# Patient Record
Sex: FEMALE | Race: WHITE | Employment: FULL TIME | URBAN - METROPOLITAN AREA
[De-identification: names, ages, dates, MRNs, and addresses within clinical notes are randomized per-mention and may not be internally consistent; named-entity substitution may affect disease eponyms.]

---

## 2017-01-13 ENCOUNTER — OFFICE VISIT (OUTPATIENT)
Dept: NUTRITION/OBESITY MEDICINE | Facility: HOSPITAL | Age: 52
End: 2017-01-13
Attending: INTERNAL MEDICINE
Payer: COMMERCIAL

## 2017-01-13 VITALS
DIASTOLIC BLOOD PRESSURE: 100 MMHG | RESPIRATION RATE: 18 BRPM | HEIGHT: 62 IN | SYSTOLIC BLOOD PRESSURE: 146 MMHG | WEIGHT: 175.31 LBS | OXYGEN SATURATION: 99 % | HEART RATE: 101 BPM | BODY MASS INDEX: 32.26 KG/M2

## 2017-01-13 DIAGNOSIS — I10 ESSENTIAL HYPERTENSION WITH GOAL BLOOD PRESSURE LESS THAN 140/90: ICD-10-CM

## 2017-01-13 DIAGNOSIS — I10 ESSENTIAL HYPERTENSION: Primary | ICD-10-CM

## 2017-01-13 DIAGNOSIS — R63.2 BINGE EATING: ICD-10-CM

## 2017-01-13 DIAGNOSIS — Z51.81 ENCOUNTER FOR THERAPEUTIC DRUG MONITORING: ICD-10-CM

## 2017-01-13 DIAGNOSIS — E66.01 MORBID OBESITY WITH BMI OF 40.0-44.9, ADULT (HCC): ICD-10-CM

## 2017-01-13 DIAGNOSIS — E66.9 OBESITY (BMI 30-39.9): ICD-10-CM

## 2017-01-13 DIAGNOSIS — K76.0 FATTY LIVER: ICD-10-CM

## 2017-01-13 PROCEDURE — 99214 OFFICE O/P EST MOD 30 MIN: CPT | Performed by: INTERNAL MEDICINE

## 2017-01-13 NOTE — PROGRESS NOTES
3709 [facility] AND WEIGHT LOSS CLINIC  [address] [place] [address]  Dept: [phone]  Loc: [phone]     Date:   2016    Patient:  Chaz Stone  :      3/30/1965  MRN:      [id number]    Chief Comp Topics   Smoking status: Never Smoker     Smokeless tobacco: Never Used    Alcohol Use: Yes  0.0 oz/week    0 Standard drinks or equivalent per week         Comment: socially    Drug Use: No    Sexual Activity: Yes    Partners: Male    Birth Control/ Prote meal    Exercise Goals Reviewed and Discussed    Walk for  45 Minutes and Treadmill for  45 Minutes    ROS:    Constitutional: negative  Respiratory: negative  Cardiovascular: negative  Gastrointestinal: positive for constipation  Musculoskeletal:negative relatively good control. Binge eating: tolerating vyvanse    Fatty liver: should improve with weight loss    Goals for next month:  1. Keep a food log. 2. Drink 48-64 ounces of non-caloric beverages per day. No fruit juices or regular soda.   3. Increas

## 2017-01-20 ENCOUNTER — TELEPHONE (OUTPATIENT)
Dept: NUTRITION/OBESITY MEDICINE | Facility: HOSPITAL | Age: 52
End: 2017-01-20

## 2017-01-20 ENCOUNTER — LAB ENCOUNTER (OUTPATIENT)
Dept: LAB | Facility: HOSPITAL | Age: 52
End: 2017-01-20
Attending: INTERNAL MEDICINE
Payer: COMMERCIAL

## 2017-01-20 DIAGNOSIS — R63.2 BINGE EATING: ICD-10-CM

## 2017-01-20 DIAGNOSIS — I10 ESSENTIAL HYPERTENSION: ICD-10-CM

## 2017-01-20 DIAGNOSIS — K76.0 FATTY LIVER: ICD-10-CM

## 2017-01-20 DIAGNOSIS — E66.9 OBESITY (BMI 30-39.9): ICD-10-CM

## 2017-01-20 DIAGNOSIS — Z51.81 ENCOUNTER FOR THERAPEUTIC DRUG MONITORING: ICD-10-CM

## 2017-01-20 LAB
25(OH)D3 SERPL-MCNC: 31 NG/ML
ALBUMIN SERPL BCP-MCNC: 4.5 G/DL (ref 3.5–4.8)
ALBUMIN/GLOB SERPL: 1.7 {RATIO} (ref 1–2)
ALP SERPL-CCNC: 70 U/L (ref 32–100)
ALT SERPL-CCNC: 42 U/L (ref 14–54)
ANION GAP SERPL CALC-SCNC: 11 MMOL/L (ref 0–18)
AST SERPL-CCNC: 31 U/L (ref 15–41)
BASOPHILS # BLD: 0 K/UL (ref 0–0.2)
BASOPHILS NFR BLD: 1 %
BILIRUB SERPL-MCNC: 1 MG/DL (ref 0.3–1.2)
BUN SERPL-MCNC: 14 MG/DL (ref 8–20)
BUN/CREAT SERPL: 18.7 (ref 10–20)
CALCIUM SERPL-MCNC: 9.2 MG/DL (ref 8.5–10.5)
CHLORIDE SERPL-SCNC: 102 MMOL/L (ref 95–110)
CHOLEST SERPL-MCNC: 241 MG/DL (ref 110–200)
CO2 SERPL-SCNC: 25 MMOL/L (ref 22–32)
CREAT SERPL-MCNC: 0.75 MG/DL (ref 0.5–1.5)
EOSINOPHIL # BLD: 0.1 K/UL (ref 0–0.7)
EOSINOPHIL NFR BLD: 2 %
ERYTHROCYTE [DISTWIDTH] IN BLOOD BY AUTOMATED COUNT: 13.2 % (ref 11–15)
GLOBULIN PLAS-MCNC: 2.7 G/DL (ref 2.5–3.7)
GLUCOSE SERPL-MCNC: 101 MG/DL (ref 70–99)
HCT VFR BLD AUTO: 47.8 % (ref 35–48)
HDLC SERPL-MCNC: 83 MG/DL
HGB BLD-MCNC: 16.3 G/DL (ref 12–16)
LDLC SERPL CALC-MCNC: 144 MG/DL (ref 0–99)
LYMPHOCYTES # BLD: 1.3 K/UL (ref 1–4)
LYMPHOCYTES NFR BLD: 22 %
MCH RBC QN AUTO: 33 PG (ref 27–32)
MCHC RBC AUTO-ENTMCNC: 34.2 G/DL (ref 32–37)
MCV RBC AUTO: 96.5 FL (ref 80–100)
MONOCYTES # BLD: 0.3 K/UL (ref 0–1)
MONOCYTES NFR BLD: 6 %
NEUTROPHILS # BLD AUTO: 4.1 K/UL (ref 1.8–7.7)
NEUTROPHILS NFR BLD: 70 %
NONHDLC SERPL-MCNC: 158 MG/DL
OSMOLALITY UR CALC.SUM OF ELEC: 287 MOSM/KG (ref 275–295)
PLATELET # BLD AUTO: 183 K/UL (ref 140–400)
PMV BLD AUTO: 9.1 FL (ref 7.4–10.3)
POTASSIUM SERPL-SCNC: 3.1 MMOL/L (ref 3.3–5.1)
PROT SERPL-MCNC: 7.2 G/DL (ref 5.9–8.4)
RBC # BLD AUTO: 4.95 M/UL (ref 3.7–5.4)
SODIUM SERPL-SCNC: 138 MMOL/L (ref 136–144)
TRIGL SERPL-MCNC: 68 MG/DL (ref 1–149)
VIT B12 SERPL-MCNC: 484 PG/ML (ref 181–914)
WBC # BLD AUTO: 5.8 K/UL (ref 4–11)

## 2017-01-20 PROCEDURE — 36415 COLL VENOUS BLD VENIPUNCTURE: CPT

## 2017-01-20 PROCEDURE — 80053 COMPREHEN METABOLIC PANEL: CPT

## 2017-01-20 PROCEDURE — 85025 COMPLETE CBC W/AUTO DIFF WBC: CPT

## 2017-01-20 PROCEDURE — 80061 LIPID PANEL: CPT

## 2017-01-20 PROCEDURE — 82607 VITAMIN B-12: CPT

## 2017-01-20 PROCEDURE — 82306 VITAMIN D 25 HYDROXY: CPT

## 2017-03-03 ENCOUNTER — OFFICE VISIT (OUTPATIENT)
Dept: SURGERY | Facility: CLINIC | Age: 52
End: 2017-03-03
Attending: INTERNAL MEDICINE

## 2017-03-03 VITALS
SYSTOLIC BLOOD PRESSURE: 151 MMHG | WEIGHT: 166.13 LBS | DIASTOLIC BLOOD PRESSURE: 95 MMHG | RESPIRATION RATE: 16 BRPM | HEIGHT: 62 IN | BODY MASS INDEX: 30.57 KG/M2 | HEART RATE: 112 BPM

## 2017-03-03 DIAGNOSIS — E87.6 HYPOKALEMIA: ICD-10-CM

## 2017-03-03 DIAGNOSIS — R63.2 BINGE EATING: ICD-10-CM

## 2017-03-03 DIAGNOSIS — I10 ESSENTIAL HYPERTENSION: Primary | ICD-10-CM

## 2017-03-03 DIAGNOSIS — K76.0 FATTY LIVER: ICD-10-CM

## 2017-03-03 DIAGNOSIS — E66.9 OBESITY (BMI 30-39.9): ICD-10-CM

## 2017-03-03 DIAGNOSIS — Z51.81 ENCOUNTER FOR THERAPEUTIC DRUG MONITORING: ICD-10-CM

## 2017-03-03 PROCEDURE — 99214 OFFICE O/P EST MOD 30 MIN: CPT | Performed by: INTERNAL MEDICINE

## 2017-03-03 RX ORDER — POTASSIUM CHLORIDE 750 MG/1
10 TABLET, FILM COATED, EXTENDED RELEASE ORAL DAILY
Qty: 30 TABLET | Refills: 0 | Status: SHIPPED | OUTPATIENT
Start: 2017-03-03 | End: 2017-04-28 | Stop reason: ALTCHOICE

## 2017-03-03 NOTE — PROGRESS NOTES
3701 [facility] AND WEIGHT LOSS CLINIC  [address] [place] [address]  Dept: [phone]  Loc: [phone]     Date:   2016    Patient:  Zeke Veras  :      3/30/1965  MRN:      [id number]    Chief Comp Name: N/A    Years of Education: N/A  Number of Children: N/A     Occupational History  None on file     Social History Main Topics   Smoking status: Never Smoker     Smokeless tobacco: Never Used    Alcohol Use: Yes  0.0 oz/week    0 Standard drinks or eq calorie intake, Identify triggers for eating and manage cues and Eat slowly and take 20 to 30 minutes to complete each meal    Exercise Goals Reviewed and Discussed    Walk for  45 Minutes and Treadmill for  45 Minutes    ROS:    Constitutional: negative Refill vyvanse  ekg done    No orders of the defined types were placed in this encounter. Blood work done  Will start K-Dur 10 mEq daily.      Follow up with Jeramy Urbina MD

## 2017-03-09 ENCOUNTER — TELEPHONE (OUTPATIENT)
Dept: FAMILY MEDICINE CLINIC | Facility: CLINIC | Age: 52
End: 2017-03-09

## 2017-03-09 NOTE — TELEPHONE ENCOUNTER
Patient reports that she has been waking up very sleepy in the morning and noticed that her BP readings have been low in the morning (upon waking) and at night (before bed). BP has been 107/80 in the mornings and at nights.  Thinks that medication may need

## 2017-03-13 NOTE — TELEPHONE ENCOUNTER
Lm ext [phone] this was our final attempt can call us back at [phone] for question or concerns. Thanks

## 2017-04-08 ENCOUNTER — OFFICE VISIT (OUTPATIENT)
Dept: FAMILY MEDICINE CLINIC | Facility: CLINIC | Age: 52
End: 2017-04-08

## 2017-04-08 VITALS
BODY MASS INDEX: 29.08 KG/M2 | HEIGHT: 62 IN | HEART RATE: 89 BPM | WEIGHT: 158 LBS | TEMPERATURE: 98 F | DIASTOLIC BLOOD PRESSURE: 82 MMHG | SYSTOLIC BLOOD PRESSURE: 134 MMHG | RESPIRATION RATE: 20 BRPM

## 2017-04-08 DIAGNOSIS — I10 ESSENTIAL HYPERTENSION: ICD-10-CM

## 2017-04-08 PROCEDURE — 99212 OFFICE O/P EST SF 10 MIN: CPT | Performed by: FAMILY MEDICINE

## 2017-04-08 PROCEDURE — 99213 OFFICE O/P EST LOW 20 MIN: CPT | Performed by: FAMILY MEDICINE

## 2017-04-08 NOTE — PROGRESS NOTES
HPI:    Patient ID: Ana Manuel is a 45year old female. HPI Comments: Patient is here for follow up for chronic medical issues- HTN. The patient is compliant with medications and no side effects.  The patient states medications have been helpful and pressures have been generally good. To monitor blood pressure; To call if any persistent elevation of blood pressure; Continue good diet/activity; Follow up as needed and with Dr Jessica Charles for weight loss management.        No orders of the defined types were

## 2017-04-28 ENCOUNTER — OFFICE VISIT (OUTPATIENT)
Dept: SURGERY | Facility: CLINIC | Age: 52
End: 2017-04-28

## 2017-04-28 ENCOUNTER — APPOINTMENT (OUTPATIENT)
Dept: LAB | Facility: HOSPITAL | Age: 52
End: 2017-04-28
Attending: INTERNAL MEDICINE
Payer: COMMERCIAL

## 2017-04-28 VITALS
DIASTOLIC BLOOD PRESSURE: 70 MMHG | SYSTOLIC BLOOD PRESSURE: 146 MMHG | BODY MASS INDEX: 28.63 KG/M2 | HEIGHT: 62 IN | WEIGHT: 155.56 LBS

## 2017-04-28 DIAGNOSIS — R63.2 BINGE EATING: ICD-10-CM

## 2017-04-28 DIAGNOSIS — E66.3 OVERWEIGHT (BMI 25.0-29.9): ICD-10-CM

## 2017-04-28 DIAGNOSIS — K76.0 FATTY LIVER: ICD-10-CM

## 2017-04-28 DIAGNOSIS — I10 ESSENTIAL HYPERTENSION: ICD-10-CM

## 2017-04-28 DIAGNOSIS — Z51.81 ENCOUNTER FOR THERAPEUTIC DRUG MONITORING: ICD-10-CM

## 2017-04-28 DIAGNOSIS — I10 ESSENTIAL HYPERTENSION: Primary | ICD-10-CM

## 2017-04-28 PROCEDURE — 80053 COMPREHEN METABOLIC PANEL: CPT

## 2017-04-28 PROCEDURE — 36415 COLL VENOUS BLD VENIPUNCTURE: CPT

## 2017-04-28 PROCEDURE — 99214 OFFICE O/P EST MOD 30 MIN: CPT | Performed by: INTERNAL MEDICINE

## 2017-04-28 NOTE — PROGRESS NOTES
3709 [facility] AND WEIGHT LOSS CLINIC  [address] [place] [address]  Dept: [phone]  Loc: [phone]     Date:   2016    Patient:  Mojgan Dickerson  :      3/30/1965  MRN:      [id number]    Chief Comp Social History Main Topics   Smoking status: Never Smoker     Smokeless tobacco: Never Used    Alcohol Use: Yes  0.0 oz/week    0 Standard drinks or equivalent per week         Comment: socially    Drug Use: No    Sexual Activity: Yes    Partners:  Male 30 minutes to complete each meal    Exercise Goals Reviewed and Discussed    Walk for  45 Minutes and Treadmill for  45 Minutes    ROS:    Constitutional: negative  Respiratory: negative  Cardiovascular: negative  Gastrointestinal: positive for constipatio This Encounter  Comp Metabolic Panel (14)  Standing Status: Future  Standing Expiration Date: 4/28/2018  Blood work done    Follow up with Samuel Spencer MD

## 2017-05-03 ENCOUNTER — TELEPHONE (OUTPATIENT)
Dept: SURGERY | Facility: CLINIC | Age: 52
End: 2017-05-03

## 2017-05-03 NOTE — TELEPHONE ENCOUNTER
Patient will need potassium test results so she can get a refill today, she will be going out of town tomorrow. Please follow up with patient, phone number [phone].

## 2017-05-03 NOTE — TELEPHONE ENCOUNTER
Pt is leaving for vac in am and is concerned about K+ levels. She is normal at 4. Per pt she will follow up with Dr Schuyler Reynaga when she is back from vac.

## 2017-06-30 ENCOUNTER — OFFICE VISIT (OUTPATIENT)
Dept: SURGERY | Facility: CLINIC | Age: 52
End: 2017-06-30

## 2017-06-30 VITALS
SYSTOLIC BLOOD PRESSURE: 140 MMHG | DIASTOLIC BLOOD PRESSURE: 72 MMHG | HEIGHT: 62 IN | WEIGHT: 147.13 LBS | BODY MASS INDEX: 27.08 KG/M2

## 2017-06-30 DIAGNOSIS — Z51.81 ENCOUNTER FOR THERAPEUTIC DRUG MONITORING: ICD-10-CM

## 2017-06-30 DIAGNOSIS — E66.3 OVERWEIGHT (BMI 25.0-29.9): ICD-10-CM

## 2017-06-30 DIAGNOSIS — I10 ESSENTIAL HYPERTENSION: ICD-10-CM

## 2017-06-30 DIAGNOSIS — K76.0 FATTY LIVER: ICD-10-CM

## 2017-06-30 DIAGNOSIS — R63.2 BINGE EATING: Primary | ICD-10-CM

## 2017-06-30 PROCEDURE — 99214 OFFICE O/P EST MOD 30 MIN: CPT | Performed by: INTERNAL MEDICINE

## 2017-06-30 NOTE — PROGRESS NOTES
370 [facility] AND WEIGHT LOSS CLINIC  [address] [place] [address]  Dept: [phone]  Loc: [phone]     Date:   2016    Patient:  Madeleine Joyce  :      3/30/1965  MRN:      [id number]    Chief Comp walking     Social History Narrative    The patient does not use an assistive device. .      The patient does live in a home with stairs.          Surgical History:    Past Surgical History:  No date: REDUCTION OF LARGE BREAST  Family History:    Family Hist negative    Physical Exam:   General appearance: alert, appears stated age and cooperative  Head: Normocephalic, without obvious abnormality, atraumatic  Lungs: clear to auscultation bilaterally  Heart: S1, S2 normal, no murmur, click, rub or gallop, regul

## 2017-09-08 ENCOUNTER — OFFICE VISIT (OUTPATIENT)
Dept: SURGERY | Facility: CLINIC | Age: 52
End: 2017-09-08

## 2017-09-08 VITALS
HEART RATE: 88 BPM | BODY MASS INDEX: 26.06 KG/M2 | SYSTOLIC BLOOD PRESSURE: 157 MMHG | WEIGHT: 141.63 LBS | RESPIRATION RATE: 18 BRPM | TEMPERATURE: 97 F | HEIGHT: 62 IN | OXYGEN SATURATION: 98 % | DIASTOLIC BLOOD PRESSURE: 98 MMHG

## 2017-09-08 DIAGNOSIS — Z51.81 ENCOUNTER FOR THERAPEUTIC DRUG MONITORING: ICD-10-CM

## 2017-09-08 DIAGNOSIS — R63.2 BINGE EATING: ICD-10-CM

## 2017-09-08 DIAGNOSIS — I10 ESSENTIAL HYPERTENSION: Primary | ICD-10-CM

## 2017-09-08 DIAGNOSIS — E66.3 OVERWEIGHT (BMI 25.0-29.9): ICD-10-CM

## 2017-09-08 PROCEDURE — 99214 OFFICE O/P EST MOD 30 MIN: CPT | Performed by: INTERNAL MEDICINE

## 2017-09-08 RX ORDER — DIETHYLPROPION HYDROCHLORIDE 25 MG/1
1 TABLET ORAL EVERY EVENING
Qty: 42 TABLET | Refills: 0 | Status: SHIPPED | OUTPATIENT
Start: 2017-09-08 | End: 2017-10-08

## 2017-09-08 NOTE — PROGRESS NOTES
3705 [facility] AND WEIGHT LOSS CLINIC  [address] [place] [address]  Dept: [phone]  Loc: [phone]     Date:   2016    Patient:  Sahrri Erickson  :      3/30/1965  MRN:      [id number]    Chief Comp protection:      Other Topics Concern    Caffeine Concern No    Comment: 2 cups of coffee daily, soda    Exercise Yes    Comment: walking     Social History Narrative    The patient does not use an assistive device. .      The patient does live in a home wi constipation  Musculoskeletal:negative  Neurological: negative  Behavioral/Psych: negative  Endocrine: negative  All other systems were reviewed and are negative    Physical Exam:   General appearance: alert, appears stated age and cooperative  Head: Normo

## 2017-12-07 ENCOUNTER — OFFICE VISIT (OUTPATIENT)
Dept: OBGYN CLINIC | Facility: CLINIC | Age: 52
End: 2017-12-07

## 2017-12-07 VITALS
WEIGHT: 145 LBS | HEART RATE: 85 BPM | DIASTOLIC BLOOD PRESSURE: 86 MMHG | SYSTOLIC BLOOD PRESSURE: 164 MMHG | HEIGHT: 62.25 IN | BODY MASS INDEX: 26.35 KG/M2

## 2017-12-07 DIAGNOSIS — Z12.4 SCREENING FOR MALIGNANT NEOPLASM OF CERVIX: ICD-10-CM

## 2017-12-07 DIAGNOSIS — Z01.419 ENCOUNTER FOR GYNECOLOGICAL EXAMINATION: Primary | ICD-10-CM

## 2017-12-07 DIAGNOSIS — Z12.31 ENCOUNTER FOR SCREENING MAMMOGRAM FOR BREAST CANCER: ICD-10-CM

## 2017-12-07 PROCEDURE — 99396 PREV VISIT EST AGE 40-64: CPT | Performed by: OBSTETRICS & GYNECOLOGY

## 2017-12-07 NOTE — PROGRESS NOTES
Zeke Veras is a 45year old female  No LMP recorded. Patient is postmenopausal. here for annual exam.       Last seen 16. Last pap 2014 normal.  No periods since stopping mini pill 2016. Lost 80 pounds in over a year.     OBSTETRIC bruising or bleeding.     PHYSICAL EXAM:   BP (!) 164/86   Pulse 85   Ht 5' 2.25\" (1.581 m)   Wt 145 lb (65.8 kg)   BMI 26.31 kg/m²   Wt Readings from Last 2 Encounters:  12/07/17 : 145 lb (65.8 kg)  09/08/17 : 141 lb 9.6 oz (64.2 kg)    Body mass index is

## 2017-12-08 ENCOUNTER — OFFICE VISIT (OUTPATIENT)
Dept: DERMATOLOGY CLINIC | Facility: CLINIC | Age: 52
End: 2017-12-08

## 2017-12-08 ENCOUNTER — OFFICE VISIT (OUTPATIENT)
Dept: SURGERY | Facility: CLINIC | Age: 52
End: 2017-12-08

## 2017-12-08 VITALS
WEIGHT: 143.88 LBS | RESPIRATION RATE: 18 BRPM | HEART RATE: 85 BPM | TEMPERATURE: 97 F | SYSTOLIC BLOOD PRESSURE: 151 MMHG | BODY MASS INDEX: 26.48 KG/M2 | OXYGEN SATURATION: 100 % | HEIGHT: 62 IN | DIASTOLIC BLOOD PRESSURE: 98 MMHG

## 2017-12-08 DIAGNOSIS — D23.5 BENIGN NEOPLASM OF SKIN OF TRUNK, EXCEPT SCROTUM: ICD-10-CM

## 2017-12-08 DIAGNOSIS — E66.3 OVERWEIGHT (BMI 25.0-29.9): ICD-10-CM

## 2017-12-08 DIAGNOSIS — Z51.81 ENCOUNTER FOR THERAPEUTIC DRUG MONITORING: ICD-10-CM

## 2017-12-08 DIAGNOSIS — D23.60 BENIGN NEOPLASM OF SKIN OF UPPER LIMB, INCLUDING SHOULDER, UNSPECIFIED LATERALITY: ICD-10-CM

## 2017-12-08 DIAGNOSIS — K76.0 FATTY LIVER: ICD-10-CM

## 2017-12-08 DIAGNOSIS — L82.1 SEBORRHEIC KERATOSES: Primary | ICD-10-CM

## 2017-12-08 DIAGNOSIS — D23.70 BENIGN NEOPLASM OF SKIN OF LOWER LIMB, INCLUDING HIP, UNSPECIFIED LATERALITY: ICD-10-CM

## 2017-12-08 DIAGNOSIS — I10 ESSENTIAL HYPERTENSION: Primary | ICD-10-CM

## 2017-12-08 DIAGNOSIS — L30.8 PSORIASIFORM DERMATITIS: ICD-10-CM

## 2017-12-08 DIAGNOSIS — D23.30 BENIGN NEOPLASM OF SKIN OF FACE: ICD-10-CM

## 2017-12-08 DIAGNOSIS — R20.2 NOTALGIA PARESTHETICA: ICD-10-CM

## 2017-12-08 DIAGNOSIS — D23.4 BENIGN NEOPLASM OF SCALP AND SKIN OF NECK: ICD-10-CM

## 2017-12-08 DIAGNOSIS — D22.9 MULTIPLE NEVI: ICD-10-CM

## 2017-12-08 DIAGNOSIS — R63.2 BINGE EATING: ICD-10-CM

## 2017-12-08 PROCEDURE — 99212 OFFICE O/P EST SF 10 MIN: CPT | Performed by: DERMATOLOGY

## 2017-12-08 PROCEDURE — 99213 OFFICE O/P EST LOW 20 MIN: CPT | Performed by: DERMATOLOGY

## 2017-12-08 PROCEDURE — 99214 OFFICE O/P EST MOD 30 MIN: CPT | Performed by: INTERNAL MEDICINE

## 2017-12-08 RX ORDER — TRIAMCINOLONE ACETONIDE 5 MG/G
CREAM TOPICAL
Qty: 60 G | Refills: 1 | Status: SHIPPED | OUTPATIENT
Start: 2017-12-08 | End: 2020-08-05

## 2017-12-08 NOTE — PROGRESS NOTES
3705 [facility] AND WEIGHT LOSS CLINIC  [address] [place] [address]  Dept: [phone]  Loc: [phone]     Date:   2016    Patient:  Sharri Erickson  :      3/30/1965  MRN:      [id number]    Chief Comp Exercise Yes    Comment: walking     Social History Narrative    The patient does not use an assistive device. .      The patient does live in a home with stairs.          Surgical History:    Past Surgical History:  No date: REDUCTION OF LARGE BREAST  Famil reviewed and are negative    Physical Exam:   General appearance: alert, appears stated age and cooperative  Head: Normocephalic, without obvious abnormality, atraumatic  Lungs: clear to auscultation bilaterally  Heart: S1, S2 normal, no murmur, click, rub

## 2017-12-09 ENCOUNTER — HOSPITAL ENCOUNTER (OUTPATIENT)
Dept: MAMMOGRAPHY | Facility: HOSPITAL | Age: 52
Discharge: HOME | End: 2017-12-09
Attending: OBSTETRICS & GYNECOLOGY
Payer: COMMERCIAL

## 2017-12-09 DIAGNOSIS — Z12.31 ENCOUNTER FOR SCREENING MAMMOGRAM FOR BREAST CANCER: ICD-10-CM

## 2017-12-09 PROCEDURE — 77067 SCR MAMMO BI INCL CAD: CPT | Performed by: OBSTETRICS & GYNECOLOGY

## 2017-12-18 NOTE — PROGRESS NOTES
Rosa Muñoz is a 45year old female. HPI:     CC:  Patient presents with:  Full Skin Exam: LOV: 5/08/15 pt presents for full body check. No new or changing moles. No family or personal hx of cancer. Allergies:  Patient has no known allergies. does not use an assistive device. .      The patient does live in a home with stairs.          Family History   Problem Relation Age of Onset   • Heart Disease Father      CHF   • Heart Disorder Other    • Cancer Paternal Grandmother      colon  cancer keratoses  (primary encounter diagnosis)  Multiple nevi  Benign neoplasm of scalp and skin of neck  Benign neoplasm of skin of face  Benign neoplasm of skin of trunk, except scrotum  Benign neoplasm of skin of upper limb, including shoulder, unspecified la patient. Sunscreen use, sun protection, self exams reviewed. Followup as noted RTC routine checkup 6 mos - one year or p.r.n.

## 2018-02-23 ENCOUNTER — OFFICE VISIT (OUTPATIENT)
Dept: SURGERY | Facility: CLINIC | Age: 53
End: 2018-02-23

## 2018-02-23 VITALS
SYSTOLIC BLOOD PRESSURE: 140 MMHG | DIASTOLIC BLOOD PRESSURE: 78 MMHG | HEIGHT: 62 IN | BODY MASS INDEX: 26.93 KG/M2 | WEIGHT: 146.31 LBS

## 2018-02-23 DIAGNOSIS — K76.0 FATTY LIVER: ICD-10-CM

## 2018-02-23 DIAGNOSIS — R63.2 BINGE EATING: Primary | ICD-10-CM

## 2018-02-23 DIAGNOSIS — I10 ESSENTIAL HYPERTENSION: ICD-10-CM

## 2018-02-23 DIAGNOSIS — E66.3 OVERWEIGHT (BMI 25.0-29.9): ICD-10-CM

## 2018-02-23 DIAGNOSIS — Z51.81 ENCOUNTER FOR THERAPEUTIC DRUG MONITORING: ICD-10-CM

## 2018-02-23 PROCEDURE — 99214 OFFICE O/P EST MOD 30 MIN: CPT | Performed by: INTERNAL MEDICINE

## 2018-02-23 RX ORDER — LISDEXAMFETAMINE DIMESYLATE 60 MG/1
CAPSULE ORAL
Refills: 0 | COMMUNITY
Start: 2018-01-22 | End: 2018-05-21

## 2018-05-21 ENCOUNTER — OFFICE VISIT (OUTPATIENT)
Dept: SURGERY | Facility: CLINIC | Age: 53
End: 2018-05-21

## 2018-05-21 VITALS
OXYGEN SATURATION: 100 % | SYSTOLIC BLOOD PRESSURE: 137 MMHG | BODY MASS INDEX: 26.39 KG/M2 | HEIGHT: 62 IN | HEART RATE: 86 BPM | RESPIRATION RATE: 18 BRPM | DIASTOLIC BLOOD PRESSURE: 91 MMHG | WEIGHT: 143.44 LBS

## 2018-05-21 DIAGNOSIS — Z51.81 ENCOUNTER FOR THERAPEUTIC DRUG MONITORING: ICD-10-CM

## 2018-05-21 DIAGNOSIS — E66.3 OVERWEIGHT (BMI 25.0-29.9): ICD-10-CM

## 2018-05-21 DIAGNOSIS — K76.0 FATTY LIVER: ICD-10-CM

## 2018-05-21 DIAGNOSIS — R63.2 BINGE EATING: ICD-10-CM

## 2018-05-21 DIAGNOSIS — I10 ESSENTIAL HYPERTENSION: Primary | ICD-10-CM

## 2018-05-21 PROCEDURE — 99214 OFFICE O/P EST MOD 30 MIN: CPT | Performed by: INTERNAL MEDICINE

## 2018-05-21 NOTE — PROGRESS NOTES
370 [facility] AND WEIGHT LOSS CLINIC  [address] [place] [address]  Dept: [phone]  Loc: [phone]     Date:   2016    Patient:  Alex Maxwell  :      3/30/1965  MRN:      [id number]    Chief Comp daily, soda    Exercise Yes    Comment: walking    Pt has a pacemaker No    Pt has a defibrillator No    Reaction to local anesthetic No     Social History Narrative    The patient does not use an assistive device. .      The patient does live in a home wit constipation  Musculoskeletal:negative  Neurological: negative  Behavioral/Psych: negative  Endocrine: negative  All other systems were reviewed and are negative    Physical Exam:   General appearance: alert, appears stated age and cooperative  Head: Normo

## 2018-08-24 ENCOUNTER — OFFICE VISIT (OUTPATIENT)
Dept: SURGERY | Facility: CLINIC | Age: 53
End: 2018-08-24
Payer: COMMERCIAL

## 2018-08-24 VITALS
BODY MASS INDEX: 27.8 KG/M2 | HEIGHT: 62 IN | DIASTOLIC BLOOD PRESSURE: 90 MMHG | SYSTOLIC BLOOD PRESSURE: 145 MMHG | HEART RATE: 71 BPM | RESPIRATION RATE: 18 BRPM | OXYGEN SATURATION: 99 % | WEIGHT: 151.06 LBS

## 2018-08-24 DIAGNOSIS — K76.0 FATTY LIVER: ICD-10-CM

## 2018-08-24 DIAGNOSIS — I10 ESSENTIAL HYPERTENSION: Primary | ICD-10-CM

## 2018-08-24 DIAGNOSIS — R63.2 BINGE EATING: ICD-10-CM

## 2018-08-24 DIAGNOSIS — Z51.81 ENCOUNTER FOR THERAPEUTIC DRUG MONITORING: ICD-10-CM

## 2018-08-24 DIAGNOSIS — E66.3 OVERWEIGHT (BMI 25.0-29.9): ICD-10-CM

## 2018-08-24 PROCEDURE — 99214 OFFICE O/P EST MOD 30 MIN: CPT | Performed by: INTERNAL MEDICINE

## 2018-08-24 RX ORDER — TOPIRAMATE 25 MG/1
25 TABLET ORAL 2 TIMES DAILY
Qty: 60 TABLET | Refills: 1 | Status: SHIPPED | OUTPATIENT
Start: 2018-08-24 | End: 2019-04-19

## 2018-08-24 NOTE — PROGRESS NOTES
370 [facility] AND [facility] LOSS CLIN[facility]  [address] [place] [address]  Dept: [phone]  Loc: [phone]     Date:   2016    Patient:  Christiane Martins  :      3/30/1965  MRN:      [id number]    Chief Comp History Narrative    The patient does not use an assistive device. .      The patient does live in a home with stairs.          Surgical History:    Past Surgical History:  No date: REDUCTION OF LARGE BREAST  Family History:    Family History   Problem Relat General appearance: alert, appears stated age and cooperative  Head: Normocephalic, without obvious abnormality, atraumatic  Lungs: clear to auscultation bilaterally  Heart: S1, S2 normal, no murmur, click, rub or gallop, regular rate and rhythm  Abdomen

## 2018-10-19 ENCOUNTER — OFFICE VISIT (OUTPATIENT)
Dept: SURGERY | Facility: CLINIC | Age: 53
End: 2018-10-19
Payer: COMMERCIAL

## 2018-10-19 VITALS
HEIGHT: 62 IN | BODY MASS INDEX: 26.72 KG/M2 | DIASTOLIC BLOOD PRESSURE: 80 MMHG | SYSTOLIC BLOOD PRESSURE: 140 MMHG | WEIGHT: 145.19 LBS

## 2018-10-19 DIAGNOSIS — I10 ESSENTIAL HYPERTENSION: Primary | ICD-10-CM

## 2018-10-19 DIAGNOSIS — Z51.81 ENCOUNTER FOR THERAPEUTIC DRUG MONITORING: ICD-10-CM

## 2018-10-19 DIAGNOSIS — E66.3 OVERWEIGHT (BMI 25.0-29.9): ICD-10-CM

## 2018-10-19 DIAGNOSIS — R63.2 BINGE EATING: ICD-10-CM

## 2018-10-19 DIAGNOSIS — K76.0 FATTY LIVER: ICD-10-CM

## 2018-10-19 PROCEDURE — 99213 OFFICE O/P EST LOW 20 MIN: CPT | Performed by: INTERNAL MEDICINE

## 2018-10-19 NOTE — PROGRESS NOTES
3703 [facility] AND WEIGHT LOSS CLINIC  [address] [place] [address]  Dept: [phone]  Loc: [phone]     Date:   2016    Patient:  Madeleine Joyce  :      3/30/1965  MRN:      [id number]    Chief Comp file      Transportation needs - medical: Not on file      Transportation needs - non-medical: Not on file    Occupational History      Not on file    Tobacco Use      Smoking status: Never Smoker      Smokeless tobacco: Never Used    Substance and Sexual day  · Length of time it takes to consume a meal:  20  · # of snacks per day: 1 Type of snacks:  Nuts, cheese sticks  · Amount of soda consumption per day:  0  · Amount of water (in ounces) per day:  64  · Drinking between meals only:  yes  · Toughest chal encounter diagnosis)  Fatty liver  Binge eating  Encounter for therapeutic drug monitoring  Overweight (bmi 25.0-29. 9)    PLAN   No orders of the defined types were placed in this encounter.     HYPERTENSION:  The patient's blood pressure has been well cont

## 2018-12-14 ENCOUNTER — TELEPHONE (OUTPATIENT)
Dept: OBGYN CLINIC | Facility: CLINIC | Age: 53
End: 2018-12-14

## 2019-01-18 ENCOUNTER — OFFICE VISIT (OUTPATIENT)
Dept: SURGERY | Facility: CLINIC | Age: 54
End: 2019-01-18
Payer: COMMERCIAL

## 2019-01-18 VITALS
RESPIRATION RATE: 16 BRPM | DIASTOLIC BLOOD PRESSURE: 90 MMHG | WEIGHT: 151 LBS | HEART RATE: 75 BPM | OXYGEN SATURATION: 100 % | BODY MASS INDEX: 27.79 KG/M2 | HEIGHT: 62 IN | SYSTOLIC BLOOD PRESSURE: 142 MMHG

## 2019-01-18 DIAGNOSIS — R63.2 BINGE EATING: ICD-10-CM

## 2019-01-18 DIAGNOSIS — K76.0 FATTY LIVER: ICD-10-CM

## 2019-01-18 DIAGNOSIS — E66.3 OVERWEIGHT (BMI 25.0-29.9): ICD-10-CM

## 2019-01-18 DIAGNOSIS — Z51.81 ENCOUNTER FOR THERAPEUTIC DRUG MONITORING: ICD-10-CM

## 2019-01-18 DIAGNOSIS — I10 ESSENTIAL HYPERTENSION: Primary | ICD-10-CM

## 2019-01-18 PROCEDURE — 99214 OFFICE O/P EST MOD 30 MIN: CPT | Performed by: INTERNAL MEDICINE

## 2019-01-18 NOTE — PROGRESS NOTES
[address] [place] [address] AND WEIGHT LOSS CLINIC  [address] [place] [address]  Dept: [phone]  Loc: [phone]     Date:   2016    Patient:  Estefanía Portillo  :      3/30/1965  MRN:      [id number]    Chief Comp file    Occupational History      Not on file    Tobacco Use      Smoking status: Never Smoker      Smokeless tobacco: Never Used    Substance and Sexual Activity      Alcohol use: Yes        Alcohol/week: 0.0 oz        Comment: socially      Drug use:  No cheese sticks  · Amount of soda consumption per day:  0  · Amount of water (in ounces) per day:  64  · Drinking between meals only:  yes  · Toughest challenge:    Nutritional Goals  Limit carbohydrates to 100 gms per day, Eat 100-200 calories within 1 hour 25.0-29. 9)    PLAN   No orders of the defined types were placed in this encounter. HYPERTENSION:  The patient's blood pressure has been well controlled. she has been checking it as instructed and has remained in relatively good control.     Binge eating

## 2019-04-19 ENCOUNTER — OFFICE VISIT (OUTPATIENT)
Dept: SURGERY | Facility: CLINIC | Age: 54
End: 2019-04-19
Payer: COMMERCIAL

## 2019-04-19 VITALS
HEART RATE: 76 BPM | WEIGHT: 152.06 LBS | BODY MASS INDEX: 27.98 KG/M2 | OXYGEN SATURATION: 100 % | HEIGHT: 62 IN | SYSTOLIC BLOOD PRESSURE: 150 MMHG | DIASTOLIC BLOOD PRESSURE: 90 MMHG | RESPIRATION RATE: 18 BRPM

## 2019-04-19 DIAGNOSIS — I10 ESSENTIAL HYPERTENSION: Primary | ICD-10-CM

## 2019-04-19 DIAGNOSIS — Z51.81 ENCOUNTER FOR THERAPEUTIC DRUG MONITORING: ICD-10-CM

## 2019-04-19 DIAGNOSIS — R63.2 BINGE EATING: ICD-10-CM

## 2019-04-19 PROCEDURE — 99214 OFFICE O/P EST MOD 30 MIN: CPT | Performed by: INTERNAL MEDICINE

## 2019-04-19 RX ORDER — HYDROCHLOROTHIAZIDE 12.5 MG/1
12.5 CAPSULE, GELATIN COATED ORAL DAILY
Qty: 30 CAPSULE | Refills: 1 | Status: SHIPPED | OUTPATIENT
Start: 2019-04-19 | End: 2019-06-14

## 2019-04-19 NOTE — PROGRESS NOTES
[address] [place] [address] AND WEIGHT LOSS CLINIC  [address] [place] [address]  Dept: [phone]  Loc: [phone]     Date:   2016    Patient:  Ana Manuel  :      3/30/1965  MRN:      [id number]    Chief Comp Smoking status: Never Smoker      Smokeless tobacco: Never Used    Substance and Sexual Activity      Alcohol use:  Yes        Alcohol/week: 0.0 oz        Comment: socially      Drug use: No      Sexual activity: Yes        Partners: Male    Lifestyle Problem Relation Age of Onset   • Heart Disease Father         CHF   • Heart Disorder Other    • Cancer Paternal Grandmother         colon  cancer       Food Journal  · Reviewed and Discussed:       · Patient has a Food Journal?: yes   · Patient is readi rate and rhythm  Abdomen: soft, non-tender; bowel sounds normal; no masses,  no organomegaly  Extremities: extremities normal, atraumatic, no cyanosis or edema  Pulses: 2+ and symmetric  Skin: Skin color, texture, turgor normal. No rashes or lesions    ASS

## 2019-06-14 RX ORDER — HYDROCHLOROTHIAZIDE 12.5 MG/1
CAPSULE, GELATIN COATED ORAL
Qty: 30 CAPSULE | Refills: 0 | Status: SHIPPED | OUTPATIENT
Start: 2019-06-14 | End: 2019-07-17

## 2019-07-17 RX ORDER — HYDROCHLOROTHIAZIDE 12.5 MG/1
CAPSULE, GELATIN COATED ORAL
Qty: 30 CAPSULE | Refills: 0 | Status: SHIPPED | OUTPATIENT
Start: 2019-07-17 | End: 2020-07-16

## 2019-09-03 ENCOUNTER — TELEPHONE (OUTPATIENT)
Dept: GASTROENTEROLOGY | Facility: CLINIC | Age: 54
End: 2019-09-03

## 2019-09-03 NOTE — TELEPHONE ENCOUNTER
----- Message from Param Michel, RN sent at 8/13/2015 10:56 AM CDT -----  Regarding: Recall Colon  Recall colon in 5 years per GS.  Colon done 10/3/14

## 2020-02-21 ENCOUNTER — TELEPHONE (OUTPATIENT)
Dept: GASTROENTEROLOGY | Facility: CLINIC | Age: 55
End: 2020-02-21

## 2020-02-21 NOTE — TELEPHONE ENCOUNTER
Last Procedure, Date, MD:  12/16/14 Heather  Last Diagnosis:  Prominent ileocecal valve, polyps  Recalled for (mth/yrs): 5  Sedation used previously:  IV sedation  Last Prep Used (if known):  miralax/gatorade  Quality of prep (if known): excellent  An

## 2020-02-24 NOTE — TELEPHONE ENCOUNTER
Left message to call back and schedule. If patient calls back please forward call to GI Schedulers.  [phone]

## 2020-02-24 NOTE — TELEPHONE ENCOUNTER
The patient's chart has been reviewed. Okay to schedule pt for 5 year colonoscopy recall r/t screening for colon cancer with Dr. Mary Bernabe.      Advise IV Twilight or MAC sedation with split dose Colyte/TriLyte or equivalent(eRx) preparation.   -Eligible

## 2020-03-10 NOTE — TELEPHONE ENCOUNTER
Scheduled for:  Colonoscopy [address]  Provider Name:  Dr Yonis Crews  Date:  05/08/2020  Location:  [facility]  Sedation:  MAC  Time:  9:45am (pt is aware that [facility] will call the day before to confirm arrival time)    Prep:  Colyte  Meds/Allergies Reconciled?:  Phys

## 2020-04-29 ENCOUNTER — TELEPHONE (OUTPATIENT)
Dept: GASTROENTEROLOGY | Facility: CLINIC | Age: 55
End: 2020-04-29

## 2020-04-29 NOTE — TELEPHONE ENCOUNTER
Dr. Mary Bernabe--    I noticed that this patient is still on the schedule at [facility] but on your email does not state to reschedule her.  Please advise, thank you

## 2020-04-30 NOTE — TELEPHONE ENCOUNTER
This procedure is to be performed for screening and since prior to opening of elective procedures on 5/11/2020 should be rescheduled.

## 2020-05-04 NOTE — TELEPHONE ENCOUNTER
Rescheduled for:  Colonoscopy [address]803  Provider Name:  Dr. Raquel Huerta  Date:    From-5/8/20  To-7/10/20  Location:    [facility]  Sedation:  MAC  Time:    From-0945  To-0900 (pt is aware that [facility] will call the day before to confirm arrival time)   Prep:  Corinne,

## 2020-07-08 ENCOUNTER — TELEPHONE (OUTPATIENT)
Dept: GASTROENTEROLOGY | Facility: CLINIC | Age: 55
End: 2020-07-08

## 2020-07-08 NOTE — TELEPHONE ENCOUNTER
Patient called in stating she has questions about the prep instruction's for the procedure on July 10th.  Please advise

## 2020-07-08 NOTE — TELEPHONE ENCOUNTER
I spoke with this patient regarding her prep instructions which she stated that it was very confusing and asked if she could drink the prep all at once and get some sleep.  I informed her that this is the way the physicians changed the prep since they are g

## 2020-07-09 ENCOUNTER — LAB REQUISITION (OUTPATIENT)
Dept: LAB | Facility: HOSPITAL | Age: 55
End: 2020-07-09
Payer: COMMERCIAL

## 2020-07-09 DIAGNOSIS — Z20.828 CONTACT WITH AND (SUSPECTED) EXPOSURE TO OTHER VIRAL COMMUNICABLE DISEASES: ICD-10-CM

## 2020-07-09 LAB — SARS-COV-2 RNA RESP QL NAA+PROBE: NOT DETECTED

## 2020-07-10 ENCOUNTER — LAB REQUISITION (OUTPATIENT)
Dept: LAB | Facility: HOSPITAL | Age: 55
End: 2020-07-10
Payer: COMMERCIAL

## 2020-07-10 ENCOUNTER — SURGERY CENTER DOCUMENTATION (OUTPATIENT)
Dept: SURGERY | Age: 55
End: 2020-07-10

## 2020-07-10 DIAGNOSIS — Z13.9 ENCOUNTER FOR SCREENING, UNSPECIFIED: ICD-10-CM

## 2020-07-10 PROCEDURE — 88305 TISSUE EXAM BY PATHOLOGIST: CPT | Performed by: INTERNAL MEDICINE

## 2020-07-10 PROCEDURE — 45385 COLONOSCOPY W/LESION REMOVAL: CPT | Performed by: INTERNAL MEDICINE

## 2020-07-10 NOTE — PROCEDURES
[address] Endoscopy Report      Date of Procedure:  07/10/20      Preoperative Diagnosis:  1. Personal history of adenomatous colon polyp  2. Colorectal cancer screening      Postoperative Diagnosis:  1. Colon polyps  2.   Sigmo Diminutive colon polyps  2. Sigmoid colon diverticulosis, currently uncomplicated. Recommendations:  1. High-fiber diet. 2.  Follow-up biopsy results. Polyp histology to determine recommendations regarding follow-up.         Antwan Hammonds MD

## 2020-07-15 ENCOUNTER — TELEPHONE (OUTPATIENT)
Dept: GASTROENTEROLOGY | Facility: CLINIC | Age: 55
End: 2020-07-15

## 2020-07-15 NOTE — TELEPHONE ENCOUNTER
Entered into Epic:     Recall for __CLN__per __Stathopoulos___in __5 years____  Last done: 7-  Next HSI:0-   updated

## 2020-07-15 NOTE — TELEPHONE ENCOUNTER
----- Message from Antwan Hammonds MD sent at 7/14/2020  8:00 PM CDT -----  Please see the following My Chart message sent to the patient:    Tg,    I left a message on your voicemail. Your colonoscopy revealed #2 polyps which were removed.   The

## 2020-07-16 ENCOUNTER — OFFICE VISIT (OUTPATIENT)
Dept: SURGERY | Facility: CLINIC | Age: 55
End: 2020-07-16
Payer: COMMERCIAL

## 2020-07-16 VITALS
BODY MASS INDEX: 31.9 KG/M2 | DIASTOLIC BLOOD PRESSURE: 89 MMHG | SYSTOLIC BLOOD PRESSURE: 162 MMHG | HEIGHT: 62 IN | OXYGEN SATURATION: 99 % | WEIGHT: 173.38 LBS | HEART RATE: 81 BPM

## 2020-07-16 DIAGNOSIS — R63.2 BINGE EATING: ICD-10-CM

## 2020-07-16 DIAGNOSIS — E66.9 OBESITY (BMI 30-39.9): ICD-10-CM

## 2020-07-16 DIAGNOSIS — Z51.81 ENCOUNTER FOR THERAPEUTIC DRUG MONITORING: ICD-10-CM

## 2020-07-16 DIAGNOSIS — K76.0 FATTY LIVER: ICD-10-CM

## 2020-07-16 DIAGNOSIS — I10 ESSENTIAL HYPERTENSION: Primary | ICD-10-CM

## 2020-07-16 PROCEDURE — 3079F DIAST BP 80-89 MM HG: CPT | Performed by: INTERNAL MEDICINE

## 2020-07-16 PROCEDURE — 99214 OFFICE O/P EST MOD 30 MIN: CPT | Performed by: INTERNAL MEDICINE

## 2020-07-16 PROCEDURE — 3077F SYST BP >= 140 MM HG: CPT | Performed by: INTERNAL MEDICINE

## 2020-07-16 PROCEDURE — 3008F BODY MASS INDEX DOCD: CPT | Performed by: INTERNAL MEDICINE

## 2020-07-16 RX ORDER — HYDROCHLOROTHIAZIDE 12.5 MG/1
12.5 CAPSULE, GELATIN COATED ORAL DAILY
Qty: 90 CAPSULE | Refills: 1 | Status: SHIPPED | OUTPATIENT
Start: 2020-07-16 | End: 2021-01-08

## 2020-07-16 NOTE — PROGRESS NOTES
4273 [facility] AND WEIGHT LOSS CLINIC  [address] [place] [address]  Dept: [address]: [phone]         Patient:  Elizabeth Fraire  :      3/30/1965  MRN:      [id number]    Chief Complaint:  Patient p status:       Spouse name: Not on file      Number of children: Not on file      Years of education: Not on file      Highest education level: Not on file    Occupational History      Not on file    Social Needs      Financial resource strain: Not o Asked        Outdoor occupation: Not Asked        Pt has a pacemaker: No        Pt has a defibrillator: No        Breast feeding: Not Asked        Reaction to local anesthetic: No    Social History Narrative      The patient does not use an assistive devic negative  Cardiovascular: negative  Gastrointestinal: positive for constipation  Musculoskeletal:negative  Neurological: negative  Behavioral/Psych: negative  Endocrine: negative  All other systems were reviewed and are negative    Physical Exam:   General phentermine/diethylproprion combo      Walking outside    Needs updated blood work      Orders Placed This Encounter      Leptin, Serum          Standing Status: Future          Standing Expiration Date: 7/16/2021      Vitamin B12          Standing Status:

## 2020-08-05 ENCOUNTER — OFFICE VISIT (OUTPATIENT)
Dept: FAMILY MEDICINE CLINIC | Facility: CLINIC | Age: 55
End: 2020-08-05
Payer: COMMERCIAL

## 2020-08-05 VITALS
TEMPERATURE: 97 F | HEIGHT: 62 IN | HEART RATE: 90 BPM | SYSTOLIC BLOOD PRESSURE: 130 MMHG | BODY MASS INDEX: 30.55 KG/M2 | WEIGHT: 166 LBS | DIASTOLIC BLOOD PRESSURE: 86 MMHG | RESPIRATION RATE: 18 BRPM

## 2020-08-05 DIAGNOSIS — Z00.00 ROUTINE PHYSICAL EXAMINATION: ICD-10-CM

## 2020-08-05 DIAGNOSIS — Z12.31 VISIT FOR SCREENING MAMMOGRAM: ICD-10-CM

## 2020-08-05 DIAGNOSIS — J01.90 ACUTE SINUSITIS, RECURRENCE NOT SPECIFIED, UNSPECIFIED LOCATION: Primary | ICD-10-CM

## 2020-08-05 PROCEDURE — 3075F SYST BP GE 130 - 139MM HG: CPT | Performed by: FAMILY MEDICINE

## 2020-08-05 PROCEDURE — 99396 PREV VISIT EST AGE 40-64: CPT | Performed by: FAMILY MEDICINE

## 2020-08-05 PROCEDURE — 3008F BODY MASS INDEX DOCD: CPT | Performed by: FAMILY MEDICINE

## 2020-08-05 PROCEDURE — 3079F DIAST BP 80-89 MM HG: CPT | Performed by: FAMILY MEDICINE

## 2020-08-05 RX ORDER — AMOXICILLIN 875 MG/1
875 TABLET, COATED ORAL 2 TIMES DAILY
Qty: 20 TABLET | Refills: 0 | Status: SHIPPED | OUTPATIENT
Start: 2020-08-05 | End: 2021-07-08 | Stop reason: ALTCHOICE

## 2020-08-05 NOTE — PROGRESS NOTES
HPI:    Patient ID: Kaveh Pena is a 60year old female. Patient is here for routine physical exam. No acute issues. Has had some sinus congestion and has been taking decongestants. No significant chronic medical problems.  Patient is requesting panda light. Conjunctivae and EOM are normal.   Neck: Normal range of motion. Neck supple. No thyromegaly present. Cardiovascular: Normal rate, regular rhythm, normal heart sounds and intact distal pulses.    Pulmonary/Chest: Effort normal and breath sounds nor

## 2020-08-07 ENCOUNTER — APPOINTMENT (OUTPATIENT)
Dept: LAB | Facility: HOSPITAL | Age: 55
End: 2020-08-07
Attending: FAMILY MEDICINE
Payer: COMMERCIAL

## 2020-08-07 DIAGNOSIS — K76.0 FATTY LIVER: ICD-10-CM

## 2020-08-07 DIAGNOSIS — E66.9 OBESITY (BMI 30-39.9): ICD-10-CM

## 2020-08-07 DIAGNOSIS — I10 ESSENTIAL HYPERTENSION: ICD-10-CM

## 2020-08-07 DIAGNOSIS — Z00.00 ROUTINE PHYSICAL EXAMINATION: ICD-10-CM

## 2020-08-07 DIAGNOSIS — Z51.81 ENCOUNTER FOR THERAPEUTIC DRUG MONITORING: ICD-10-CM

## 2020-08-07 DIAGNOSIS — R63.2 BINGE EATING: ICD-10-CM

## 2020-08-07 LAB
ALBUMIN SERPL-MCNC: 4.3 G/DL (ref 3.4–5)
ALBUMIN/GLOB SERPL: 1.3 {RATIO} (ref 1–2)
ALP LIVER SERPL-CCNC: 54 U/L (ref 46–118)
ALT SERPL-CCNC: 32 U/L (ref 13–56)
ANION GAP SERPL CALC-SCNC: 7 MMOL/L (ref 0–18)
AST SERPL-CCNC: 17 U/L (ref 15–37)
BILIRUB SERPL-MCNC: 0.5 MG/DL (ref 0.1–2)
BUN BLD-MCNC: 13 MG/DL (ref 7–18)
BUN/CREAT SERPL: 17.1 (ref 10–20)
CALCIUM BLD-MCNC: 9.1 MG/DL (ref 8.5–10.1)
CHLORIDE SERPL-SCNC: 105 MMOL/L (ref 98–112)
CHOLEST SMN-MCNC: 232 MG/DL (ref ?–200)
CO2 SERPL-SCNC: 27 MMOL/L (ref 21–32)
CREAT BLD-MCNC: 0.76 MG/DL (ref 0.55–1.02)
DEPRECATED RDW RBC AUTO: 41.1 FL (ref 35.1–46.3)
ERYTHROCYTE [DISTWIDTH] IN BLOOD BY AUTOMATED COUNT: 11.8 % (ref 11–15)
GLOBULIN PLAS-MCNC: 3.3 G/DL (ref 2.8–4.4)
GLUCOSE BLD-MCNC: 92 MG/DL (ref 70–99)
HCT VFR BLD AUTO: 45 % (ref 35–48)
HDLC SERPL-MCNC: 89 MG/DL (ref 40–59)
HGB BLD-MCNC: 15.4 G/DL (ref 12–16)
LDLC SERPL CALC-MCNC: 124 MG/DL (ref ?–100)
M PROTEIN MFR SERPL ELPH: 7.6 G/DL (ref 6.4–8.2)
MCH RBC QN AUTO: 32.6 PG (ref 26–34)
MCHC RBC AUTO-ENTMCNC: 34.2 G/DL (ref 31–37)
MCV RBC AUTO: 95.1 FL (ref 80–100)
NONHDLC SERPL-MCNC: 143 MG/DL (ref ?–130)
OSMOLALITY SERPL CALC.SUM OF ELEC: 288 MOSM/KG (ref 275–295)
PATIENT FASTING Y/N/NP: YES
PATIENT FASTING Y/N/NP: YES
PLATELET # BLD AUTO: 185 10(3)UL (ref 150–450)
POTASSIUM SERPL-SCNC: 3.5 MMOL/L (ref 3.5–5.1)
RBC # BLD AUTO: 4.73 X10(6)UL (ref 3.8–5.3)
SODIUM SERPL-SCNC: 139 MMOL/L (ref 136–145)
TRIGL SERPL-MCNC: 96 MG/DL (ref 30–149)
TSI SER-ACNC: 2.37 MIU/ML (ref 0.36–3.74)
VIT B12 SERPL-MCNC: 665 PG/ML (ref 193–986)
VLDLC SERPL CALC-MCNC: 19 MG/DL (ref 0–30)
WBC # BLD AUTO: 4.8 X10(3) UL (ref 4–11)

## 2020-08-07 PROCEDURE — 93010 ELECTROCARDIOGRAM REPORT: CPT | Performed by: INTERNAL MEDICINE

## 2020-08-07 PROCEDURE — 93005 ELECTROCARDIOGRAM TRACING: CPT

## 2020-08-07 PROCEDURE — 82397 CHEMILUMINESCENT ASSAY: CPT

## 2020-08-07 PROCEDURE — 82607 VITAMIN B-12: CPT

## 2020-08-07 PROCEDURE — 85027 COMPLETE CBC AUTOMATED: CPT

## 2020-08-07 PROCEDURE — 80061 LIPID PANEL: CPT

## 2020-08-07 PROCEDURE — 80053 COMPREHEN METABOLIC PANEL: CPT

## 2020-08-07 PROCEDURE — 84443 ASSAY THYROID STIM HORMONE: CPT

## 2020-08-07 PROCEDURE — 36415 COLL VENOUS BLD VENIPUNCTURE: CPT

## 2020-08-10 LAB — LEPTIN: 16.5 NG/ML

## 2020-09-25 ENCOUNTER — OFFICE VISIT (OUTPATIENT)
Dept: SURGERY | Facility: CLINIC | Age: 55
End: 2020-09-25
Payer: COMMERCIAL

## 2020-09-25 VITALS
HEIGHT: 62 IN | BODY MASS INDEX: 29.08 KG/M2 | SYSTOLIC BLOOD PRESSURE: 130 MMHG | DIASTOLIC BLOOD PRESSURE: 80 MMHG | WEIGHT: 158 LBS

## 2020-09-25 DIAGNOSIS — R63.2 BINGE EATING: Primary | ICD-10-CM

## 2020-09-25 DIAGNOSIS — Z51.81 ENCOUNTER FOR THERAPEUTIC DRUG MONITORING: ICD-10-CM

## 2020-09-25 DIAGNOSIS — E66.3 OVERWEIGHT (BMI 25.0-29.9): ICD-10-CM

## 2020-09-25 DIAGNOSIS — K76.0 FATTY LIVER: ICD-10-CM

## 2020-09-25 DIAGNOSIS — I10 ESSENTIAL HYPERTENSION: ICD-10-CM

## 2020-09-25 PROCEDURE — 99214 OFFICE O/P EST MOD 30 MIN: CPT | Performed by: INTERNAL MEDICINE

## 2020-09-25 PROCEDURE — 3008F BODY MASS INDEX DOCD: CPT | Performed by: INTERNAL MEDICINE

## 2020-09-25 PROCEDURE — 3079F DIAST BP 80-89 MM HG: CPT | Performed by: INTERNAL MEDICINE

## 2020-09-25 PROCEDURE — 3075F SYST BP GE 130 - 139MM HG: CPT | Performed by: INTERNAL MEDICINE

## 2020-09-25 NOTE — PROGRESS NOTES
9569 [facility] AND WEIGHT LOSS CLINIC  [address] [place] [address]  Dept: [address]: [phone]         Patient:  Lea Guzman  :      3/30/1965  MRN:      [id number]    Chief Complaint:  Patient p 90 capsule 1     Allergies:  Patient has no known allergies.      Social History:    Social History    Socioeconomic History      Marital status:       Spouse name: Not on file      Number of children: Not on file      Years of education: Not on file Asked        Seat Belt: Not Asked        Self-Exams: Not Asked        Grew up on a farm: Not Asked        History of tanning: Not Asked        Outdoor occupation: Not Asked        Pt has a pacemaker: No        Pt has a defibrillator: No        Breast feedi Discussed    Walk for  45 Minutes and Treadmill for  45 Minutes    ROS:    Constitutional: negative  Respiratory: negative  Cardiovascular: negative  Gastrointestinal: positive for constipation  Musculoskeletal:negative  Neurological: negative  Behavioral/ encounter.       Follow up with Steve Lopez MD

## 2020-10-22 ENCOUNTER — TELEPHONE (OUTPATIENT)
Dept: SURGERY | Facility: CLINIC | Age: 55
End: 2020-10-22

## 2020-12-11 ENCOUNTER — OFFICE VISIT (OUTPATIENT)
Dept: SURGERY | Facility: CLINIC | Age: 55
End: 2020-12-11
Payer: COMMERCIAL

## 2020-12-11 VITALS
HEIGHT: 62 IN | DIASTOLIC BLOOD PRESSURE: 90 MMHG | WEIGHT: 154 LBS | BODY MASS INDEX: 28.34 KG/M2 | OXYGEN SATURATION: 100 % | HEART RATE: 83 BPM | SYSTOLIC BLOOD PRESSURE: 148 MMHG

## 2020-12-11 DIAGNOSIS — K76.0 FATTY LIVER: ICD-10-CM

## 2020-12-11 DIAGNOSIS — E66.3 OVERWEIGHT (BMI 25.0-29.9): ICD-10-CM

## 2020-12-11 DIAGNOSIS — R63.2 BINGE EATING: ICD-10-CM

## 2020-12-11 DIAGNOSIS — Z51.81 ENCOUNTER FOR THERAPEUTIC DRUG MONITORING: ICD-10-CM

## 2020-12-11 DIAGNOSIS — I10 ESSENTIAL HYPERTENSION: Primary | ICD-10-CM

## 2020-12-11 PROCEDURE — 99214 OFFICE O/P EST MOD 30 MIN: CPT | Performed by: INTERNAL MEDICINE

## 2020-12-11 PROCEDURE — 3077F SYST BP >= 140 MM HG: CPT | Performed by: INTERNAL MEDICINE

## 2020-12-11 PROCEDURE — 3080F DIAST BP >= 90 MM HG: CPT | Performed by: INTERNAL MEDICINE

## 2020-12-11 PROCEDURE — 3008F BODY MASS INDEX DOCD: CPT | Performed by: INTERNAL MEDICINE

## 2020-12-11 NOTE — PROGRESS NOTES
9464 [facility] AND WEIGHT LOSS CLINIC  [address] [place] [address]  Dept: [address]: [phone]         Patient:  Jannet Tai  :      3/30/1965  MRN:      [id number]    Chief Complaint:  Patient p on file      Number of children: Not on file      Years of education: Not on file      Highest education level: Not on file    Occupational History      Not on file    Social Needs      Financial resource strain: Not on file      Food insecurity        Wor Pt has a pacemaker: No        Pt has a defibrillator: No        Breast feeding: Not Asked        Reaction to local anesthetic: No    Social History Narrative      The patient does not use an assistive device. .        The patient does live in a home with for constipation  Musculoskeletal:negative  Neurological: negative  Behavioral/Psych: negative  Endocrine: negative  All other systems were reviewed and are negative    Physical Exam:   General appearance: alert, appears stated age and cooperative  Head: N

## 2020-12-23 ENCOUNTER — HOSPITAL ENCOUNTER (OUTPATIENT)
Dept: MAMMOGRAPHY | Facility: HOSPITAL | Age: 55
Discharge: HOME | End: 2020-12-23
Attending: FAMILY MEDICINE
Payer: COMMERCIAL

## 2020-12-23 DIAGNOSIS — Z12.31 VISIT FOR SCREENING MAMMOGRAM: ICD-10-CM

## 2020-12-23 PROCEDURE — 77063 BREAST TOMOSYNTHESIS BI: CPT | Performed by: FAMILY MEDICINE

## 2020-12-23 PROCEDURE — 77067 SCR MAMMO BI INCL CAD: CPT | Performed by: FAMILY MEDICINE

## 2021-01-08 RX ORDER — HYDROCHLOROTHIAZIDE 12.5 MG/1
CAPSULE, GELATIN COATED ORAL
Qty: 90 CAPSULE | Refills: 0 | Status: SHIPPED | OUTPATIENT
Start: 2021-01-08 | End: 2021-04-21

## 2021-01-22 RX ORDER — LISDEXAMFETAMINE DIMESYLATE 70 MG/1
CAPSULE ORAL
Qty: 30 CAPSULE | Refills: 0 | Status: SHIPPED | OUTPATIENT
Start: 2021-01-22 | End: 2021-02-25

## 2021-02-25 RX ORDER — LISDEXAMFETAMINE DIMESYLATE 70 MG/1
CAPSULE ORAL
Qty: 30 CAPSULE | Refills: 0 | Status: SHIPPED | OUTPATIENT
Start: 2021-02-25 | End: 2021-04-13

## 2021-04-13 ENCOUNTER — OFFICE VISIT (OUTPATIENT)
Dept: SURGERY | Facility: CLINIC | Age: 56
End: 2021-04-13
Payer: COMMERCIAL

## 2021-04-13 VITALS
WEIGHT: 158 LBS | DIASTOLIC BLOOD PRESSURE: 80 MMHG | HEIGHT: 62 IN | BODY MASS INDEX: 29.08 KG/M2 | SYSTOLIC BLOOD PRESSURE: 130 MMHG | OXYGEN SATURATION: 100 % | HEART RATE: 77 BPM

## 2021-04-13 DIAGNOSIS — I10 ESSENTIAL HYPERTENSION: Primary | ICD-10-CM

## 2021-04-13 DIAGNOSIS — Z51.81 ENCOUNTER FOR THERAPEUTIC DRUG MONITORING: ICD-10-CM

## 2021-04-13 DIAGNOSIS — R63.2 BINGE EATING: ICD-10-CM

## 2021-04-13 DIAGNOSIS — E66.3 OVERWEIGHT (BMI 25.0-29.9): ICD-10-CM

## 2021-04-13 PROCEDURE — 99214 OFFICE O/P EST MOD 30 MIN: CPT | Performed by: INTERNAL MEDICINE

## 2021-04-13 PROCEDURE — 3008F BODY MASS INDEX DOCD: CPT | Performed by: INTERNAL MEDICINE

## 2021-04-13 PROCEDURE — 3075F SYST BP GE 130 - 139MM HG: CPT | Performed by: INTERNAL MEDICINE

## 2021-04-13 PROCEDURE — 3079F DIAST BP 80-89 MM HG: CPT | Performed by: INTERNAL MEDICINE

## 2021-04-13 NOTE — PROGRESS NOTES
3826 [facility] AND WEIGHT LOSS CLINIC  [address] [place] [address]  Dept: [address]: [phone]         Patient:  Elbert Moran  :      3/30/1965  MRN:      [id number]    Chief Complaint:  Patient p Smoker      Smokeless tobacco: Never Used    Substance and Sexual Activity      Alcohol use:  Yes        Alcohol/week: 0.0 standard drinks        Comment: socially      Drug use: No      Sexual activity: Yes        Partners: Male    Other Topics      Concer     Emotionally Abused:       Physically Abused:       Sexually Abused:   Surgical History:    Past Surgical History:   Procedure Laterality Date   • REDUCTION OF LARGE BREAST       Family History:    Family History   Problem Relation Age of Onset   • He appearance: alert, appears stated age and cooperative  Head: Normocephalic, without obvious abnormality, atraumatic  Lungs: clear to auscultation bilaterally  Heart: S1, S2 normal, no murmur, click, rub or gallop, regular rate and rhythm  Abdomen: soft, no

## 2021-04-21 RX ORDER — HYDROCHLOROTHIAZIDE 12.5 MG/1
12.5 CAPSULE, GELATIN COATED ORAL DAILY
Qty: 90 CAPSULE | Refills: 0 | Status: SHIPPED | OUTPATIENT
Start: 2021-04-21 | End: 2021-07-29

## 2021-07-08 ENCOUNTER — OFFICE VISIT (OUTPATIENT)
Dept: SURGERY | Facility: CLINIC | Age: 56
End: 2021-07-08
Payer: COMMERCIAL

## 2021-07-08 VITALS
OXYGEN SATURATION: 97 % | WEIGHT: 155.88 LBS | DIASTOLIC BLOOD PRESSURE: 82 MMHG | HEART RATE: 85 BPM | BODY MASS INDEX: 28.69 KG/M2 | HEIGHT: 62 IN | SYSTOLIC BLOOD PRESSURE: 148 MMHG

## 2021-07-08 DIAGNOSIS — I10 ESSENTIAL HYPERTENSION: Primary | ICD-10-CM

## 2021-07-08 DIAGNOSIS — R63.2 BINGE EATING: ICD-10-CM

## 2021-07-08 DIAGNOSIS — Z51.81 ENCOUNTER FOR THERAPEUTIC DRUG MONITORING: ICD-10-CM

## 2021-07-08 DIAGNOSIS — E66.3 OVERWEIGHT (BMI 25.0-29.9): ICD-10-CM

## 2021-07-08 DIAGNOSIS — K76.0 FATTY LIVER: ICD-10-CM

## 2021-07-08 PROCEDURE — 99214 OFFICE O/P EST MOD 30 MIN: CPT | Performed by: INTERNAL MEDICINE

## 2021-07-08 PROCEDURE — 3077F SYST BP >= 140 MM HG: CPT | Performed by: INTERNAL MEDICINE

## 2021-07-08 PROCEDURE — 3079F DIAST BP 80-89 MM HG: CPT | Performed by: INTERNAL MEDICINE

## 2021-07-08 PROCEDURE — 3008F BODY MASS INDEX DOCD: CPT | Performed by: INTERNAL MEDICINE

## 2021-07-08 NOTE — PROGRESS NOTES
5644 [facility] AND WEIGHT LOSS CLINIC  [address] [place] [address]  Dept: [address]: [phone]         Patient:  Sandoval Reynaga  :      3/30/1965  MRN:      [id number]    Chief Complaint:  Patient p Yes        Alcohol/week: 0.0 standard drinks        Comment: socially      Drug use: No      Sexual activity: Yes        Partners: Male    Other Topics      Concerns:         Service: Not Asked        Blood Transfusions: Not Asked        Caffeine C Past Surgical History:   Procedure Laterality Date   • REDUCTION OF LARGE BREAST       Family History:    Family History   Problem Relation Age of Onset   • Heart Disease Father         CHF   • Heart Disorder Other    • Cancer Paternal Grandmother abnormality, atraumatic  Lungs: clear to auscultation bilaterally  Heart: S1, S2 normal, no murmur, click, rub or gallop, regular rate and rhythm  Abdomen: soft, non-tender; bowel sounds normal; no masses,  no organomegaly  Extremities: extremities normal,

## 2021-07-29 RX ORDER — HYDROCHLOROTHIAZIDE 12.5 MG/1
CAPSULE, GELATIN COATED ORAL
Qty: 90 CAPSULE | Refills: 0 | Status: SHIPPED | OUTPATIENT
Start: 2021-07-29

## 2021-12-13 ENCOUNTER — TELEMEDICINE (OUTPATIENT)
Dept: FAMILY MEDICINE CLINIC | Facility: CLINIC | Age: 56
End: 2021-12-13
Payer: COMMERCIAL

## 2021-12-13 DIAGNOSIS — J01.90 ACUTE SINUSITIS, RECURRENCE NOT SPECIFIED, UNSPECIFIED LOCATION: Primary | ICD-10-CM

## 2021-12-13 PROCEDURE — 99213 OFFICE O/P EST LOW 20 MIN: CPT | Performed by: FAMILY MEDICINE

## 2021-12-13 RX ORDER — AMOXICILLIN 875 MG/1
875 TABLET, COATED ORAL 2 TIMES DAILY
Qty: 20 TABLET | Refills: 0 | Status: SHIPPED | OUTPATIENT
Start: 2021-12-13

## 2021-12-13 NOTE — PROGRESS NOTES
Subjective:   Patient ID: Valerie Diaz is a 60year old female. Virtual Telephone Check-In    Valerie Diaz verbally consents to a Virtual/Telephone Check-In visit on 12/13/21.   Patient has been referred to the [facility] website at [email or web address] discussed; To call if worse or not better; Follow up in one week if not resolved or as needed if worse. No orders of the defined types were placed in this encounter.       Meds This Visit:  Requested Prescriptions     Signed Prescriptions Disp Refills

## 2022-12-08 ENCOUNTER — OFFICE VISIT (OUTPATIENT)
Dept: FAMILY MEDICINE CLINIC | Facility: CLINIC | Age: 57
End: 2022-12-08
Payer: COMMERCIAL

## 2022-12-08 VITALS
WEIGHT: 169 LBS | HEIGHT: 62.4 IN | HEART RATE: 73 BPM | BODY MASS INDEX: 30.71 KG/M2 | DIASTOLIC BLOOD PRESSURE: 86 MMHG | TEMPERATURE: 98 F | RESPIRATION RATE: 16 BRPM | SYSTOLIC BLOOD PRESSURE: 142 MMHG

## 2022-12-08 DIAGNOSIS — Z00.00 ROUTINE PHYSICAL EXAMINATION: Primary | ICD-10-CM

## 2022-12-08 DIAGNOSIS — Z12.31 VISIT FOR SCREENING MAMMOGRAM: ICD-10-CM

## 2022-12-08 DIAGNOSIS — R03.0 ELEVATED BLOOD PRESSURE READING: ICD-10-CM

## 2022-12-08 PROCEDURE — 3079F DIAST BP 80-89 MM HG: CPT | Performed by: FAMILY MEDICINE

## 2022-12-08 PROCEDURE — 99396 PREV VISIT EST AGE 40-64: CPT | Performed by: FAMILY MEDICINE

## 2022-12-08 PROCEDURE — 3077F SYST BP >= 140 MM HG: CPT | Performed by: FAMILY MEDICINE

## 2022-12-08 PROCEDURE — 3008F BODY MASS INDEX DOCD: CPT | Performed by: FAMILY MEDICINE

## 2022-12-08 NOTE — PROGRESS NOTES
Subjective:   Patient ID: Patrice Toledo is a 59year old female. Patient is here for routine physical exam. No acute issues. No significant chronic medical problems. Patient is requesting testing. Diet and exercise have been fair. Not too active and gained some weight. Some stiffness of the right hand and fingers in morning. No sig family hx of arthritis. Past medical history, family history, and social history were reviewed. Has follow up with gyne scheduled for well woman exam and pap smear. Pt also had been seeing Dr. Som Monet for weight. Was on hydrochlorothiazide but stopped this and did not renew. Pt would prefer work on diet and exercise/ weight loss at this time as opposed to taking medication. History/Other:   Review of Systems   Constitutional: Negative. Negative for fever. HENT: Negative. Eyes: Negative. Respiratory: Negative. Negative for cough and shortness of breath. Cardiovascular: Negative. Negative for chest pain. Gastrointestinal: Negative. Negative for abdominal pain. Genitourinary: Negative. Negative for difficulty urinating and hematuria. Musculoskeletal: Negative. Negative for joint swelling. Arthralgias: stiffness. Skin: Negative. Neurological: Negative. Negative for dizziness and headaches. Psychiatric/Behavioral: Negative. Negative for dysphoric mood. The patient is not nervous/anxious. No current outpatient medications on file. Allergies:No Known Allergies    Objective:   Physical Exam  Constitutional:       Appearance: Normal appearance. She is well-developed. HENT:      Head: Normocephalic and atraumatic. Right Ear: Tympanic membrane and external ear normal.      Left Ear: Tympanic membrane and external ear normal.      Nose: Nose normal.      Mouth/Throat:      Mouth: Mucous membranes are moist.      Pharynx: No posterior oropharyngeal erythema.    Eyes:      Conjunctiva/sclera: Conjunctivae normal.   Neck:      Thyroid: No thyroid mass, thyromegaly or thyroid tenderness. Cardiovascular:      Rate and Rhythm: Normal rate and regular rhythm. Pulses: Normal pulses. Heart sounds: Normal heart sounds. Pulmonary:      Effort: Pulmonary effort is normal.      Breath sounds: Normal breath sounds. Abdominal:      General: Abdomen is flat. There is no distension. Palpations: Abdomen is soft. Tenderness: There is no abdominal tenderness. There is no guarding or rebound. Genitourinary:     Vagina: Normal.   Musculoskeletal:         General: Normal range of motion. Cervical back: Normal range of motion and neck supple. Lymphadenopathy:      Cervical: No cervical adenopathy. Skin:     General: Skin is warm. Neurological:      General: No focal deficit present. Mental Status: She is alert and oriented to person, place, and time. Deep Tendon Reflexes: Reflexes are normal and symmetric. Psychiatric:         Mood and Affect: Mood normal.         Behavior: Behavior normal.         Thought Content: Thought content normal.         Judgment: Judgment normal.         Assessment & Plan:   Routine physical examination:  - Exam is unremarkable. Screening tests were discussed, and after discussion, will check lab work as below. Healthy diet, exercise, and weight were discussed. To call if problems and follow up and further management after testing. Routine follow up with gyne for well woman exam and pap smear. Visit for screening mammogram:  - Mammogram ordered as requested; Follow up and further management after testing    Elevated blood pressure reading: not taking hydrochlorothiazide  - Discussed medication but declined at this time; To monitor blood pressure at home; To call if any persistent elevation of blood pressure; Discussed good diet and activity and weight loss; Follow up as needed and with Dr Aurelio Muniz for weight as needed.        Orders Placed This Encounter      Assay, Thyroid Stim Hormone Lipid Panel      Comp Metabolic Panel (14)      CBC, Platelet;  No Differential      Meds This Visit:  Requested Prescriptions      No prescriptions requested or ordered in this encounter       Imaging & Referrals:  [facility] PRACHI 2D+3D SCREENING BILAT (CPT=77067/85232)

## 2022-12-15 ENCOUNTER — LAB ENCOUNTER (OUTPATIENT)
Dept: LAB | Facility: HOSPITAL | Age: 57
End: 2022-12-15
Attending: FAMILY MEDICINE
Payer: COMMERCIAL

## 2022-12-15 DIAGNOSIS — Z00.00 ROUTINE PHYSICAL EXAMINATION: ICD-10-CM

## 2022-12-15 LAB
ALBUMIN SERPL-MCNC: 4.3 G/DL (ref 3.4–5)
ALBUMIN/GLOB SERPL: 1.3 {RATIO} (ref 1–2)
ALP LIVER SERPL-CCNC: 57 U/L
ALT SERPL-CCNC: 26 U/L
ANION GAP SERPL CALC-SCNC: 3 MMOL/L (ref 0–18)
AST SERPL-CCNC: 16 U/L (ref 15–37)
BILIRUB SERPL-MCNC: 0.7 MG/DL (ref 0.1–2)
BUN BLD-MCNC: 16 MG/DL (ref 7–18)
BUN/CREAT SERPL: 24.2 (ref 10–20)
CALCIUM BLD-MCNC: 8.7 MG/DL (ref 8.5–10.1)
CHLORIDE SERPL-SCNC: 109 MMOL/L (ref 98–112)
CHOLEST SERPL-MCNC: 276 MG/DL (ref ?–200)
CO2 SERPL-SCNC: 27 MMOL/L (ref 21–32)
CREAT BLD-MCNC: 0.66 MG/DL
DEPRECATED RDW RBC AUTO: 43.8 FL (ref 35.1–46.3)
ERYTHROCYTE [DISTWIDTH] IN BLOOD BY AUTOMATED COUNT: 12 % (ref 11–15)
FASTING PATIENT LIPID ANSWER: YES
FASTING STATUS PATIENT QL REPORTED: YES
GFR SERPLBLD BASED ON 1.73 SQ M-ARVRAT: 101 ML/MIN/1.73M2 (ref 60–?)
GLOBULIN PLAS-MCNC: 3.4 G/DL (ref 2.8–4.4)
GLUCOSE BLD-MCNC: 100 MG/DL (ref 70–99)
HCT VFR BLD AUTO: 46.3 %
HDLC SERPL-MCNC: 110 MG/DL (ref 40–59)
HGB BLD-MCNC: 15.4 G/DL
LDLC SERPL CALC-MCNC: 149 MG/DL (ref ?–100)
MCH RBC QN AUTO: 32.8 PG (ref 26–34)
MCHC RBC AUTO-ENTMCNC: 33.3 G/DL (ref 31–37)
MCV RBC AUTO: 98.7 FL
NONHDLC SERPL-MCNC: 166 MG/DL (ref ?–130)
OSMOLALITY SERPL CALC.SUM OF ELEC: 289 MOSM/KG (ref 275–295)
PLATELET # BLD AUTO: 183 10(3)UL (ref 150–450)
POTASSIUM SERPL-SCNC: 3.8 MMOL/L (ref 3.5–5.1)
PROT SERPL-MCNC: 7.7 G/DL (ref 6.4–8.2)
RBC # BLD AUTO: 4.69 X10(6)UL
SODIUM SERPL-SCNC: 139 MMOL/L (ref 136–145)
TRIGL SERPL-MCNC: 103 MG/DL (ref 30–149)
TSI SER-ACNC: 2.68 MIU/ML (ref 0.36–3.74)
VLDLC SERPL CALC-MCNC: 19 MG/DL (ref 0–30)
WBC # BLD AUTO: 4.3 X10(3) UL (ref 4–11)

## 2022-12-15 PROCEDURE — 80061 LIPID PANEL: CPT

## 2022-12-15 PROCEDURE — 80053 COMPREHEN METABOLIC PANEL: CPT

## 2022-12-15 PROCEDURE — 84443 ASSAY THYROID STIM HORMONE: CPT

## 2022-12-15 PROCEDURE — 85027 COMPLETE CBC AUTOMATED: CPT

## 2022-12-15 PROCEDURE — 36415 COLL VENOUS BLD VENIPUNCTURE: CPT

## 2022-12-29 ENCOUNTER — OFFICE VISIT (OUTPATIENT)
Dept: OBGYN CLINIC | Facility: CLINIC | Age: 57
End: 2022-12-29
Payer: COMMERCIAL

## 2022-12-29 VITALS
DIASTOLIC BLOOD PRESSURE: 88 MMHG | HEART RATE: 89 BPM | WEIGHT: 170 LBS | BODY MASS INDEX: 31 KG/M2 | SYSTOLIC BLOOD PRESSURE: 156 MMHG

## 2022-12-29 DIAGNOSIS — Z12.4 SCREENING FOR MALIGNANT NEOPLASM OF CERVIX: Primary | ICD-10-CM

## 2022-12-29 DIAGNOSIS — Z01.419 ENCOUNTER FOR GYNECOLOGICAL EXAMINATION: ICD-10-CM

## 2022-12-29 PROCEDURE — 99396 PREV VISIT EST AGE 40-64: CPT | Performed by: OBSTETRICS & GYNECOLOGY

## 2022-12-29 PROCEDURE — 3079F DIAST BP 80-89 MM HG: CPT | Performed by: OBSTETRICS & GYNECOLOGY

## 2022-12-29 PROCEDURE — 3077F SYST BP >= 140 MM HG: CPT | Performed by: OBSTETRICS & GYNECOLOGY

## 2022-12-30 LAB — HPV I/H RISK 1 DNA SPEC QL NAA+PROBE: NEGATIVE

## 2023-01-17 ENCOUNTER — HOSPITAL ENCOUNTER (OUTPATIENT)
Dept: MAMMOGRAPHY | Facility: HOSPITAL | Age: 58
Discharge: HOME | End: 2023-01-17
Attending: FAMILY MEDICINE
Payer: COMMERCIAL

## 2023-01-17 DIAGNOSIS — Z12.31 VISIT FOR SCREENING MAMMOGRAM: ICD-10-CM

## 2023-01-17 PROCEDURE — 77063 BREAST TOMOSYNTHESIS BI: CPT | Performed by: FAMILY MEDICINE

## 2023-01-17 PROCEDURE — 77067 SCR MAMMO BI INCL CAD: CPT | Performed by: FAMILY MEDICINE

## 2023-03-30 ENCOUNTER — OFFICE VISIT (OUTPATIENT)
Dept: SURGERY | Facility: CLINIC | Age: 58
End: 2023-03-30
Payer: COMMERCIAL

## 2023-03-30 VITALS
HEIGHT: 62 IN | BODY MASS INDEX: 32.27 KG/M2 | SYSTOLIC BLOOD PRESSURE: 172 MMHG | OXYGEN SATURATION: 96 % | WEIGHT: 175.38 LBS | DIASTOLIC BLOOD PRESSURE: 96 MMHG | HEART RATE: 90 BPM

## 2023-03-30 DIAGNOSIS — R63.2 BINGE EATING: ICD-10-CM

## 2023-03-30 DIAGNOSIS — Z51.81 ENCOUNTER FOR THERAPEUTIC DRUG MONITORING: ICD-10-CM

## 2023-03-30 DIAGNOSIS — I10 ESSENTIAL HYPERTENSION: Primary | ICD-10-CM

## 2023-03-30 DIAGNOSIS — E66.9 OBESITY (BMI 30-39.9): ICD-10-CM

## 2023-03-30 PROCEDURE — 3080F DIAST BP >= 90 MM HG: CPT | Performed by: INTERNAL MEDICINE

## 2023-03-30 PROCEDURE — 99215 OFFICE O/P EST HI 40 MIN: CPT | Performed by: INTERNAL MEDICINE

## 2023-03-30 PROCEDURE — 3008F BODY MASS INDEX DOCD: CPT | Performed by: INTERNAL MEDICINE

## 2023-03-30 PROCEDURE — 3077F SYST BP >= 140 MM HG: CPT | Performed by: INTERNAL MEDICINE

## 2023-03-30 RX ORDER — HYDROCHLOROTHIAZIDE 12.5 MG/1
12.5 CAPSULE, GELATIN COATED ORAL DAILY
Qty: 90 CAPSULE | Refills: 0 | Status: SHIPPED | OUTPATIENT
Start: 2023-03-30

## 2023-04-27 ENCOUNTER — LAB ENCOUNTER (OUTPATIENT)
Dept: LAB | Facility: HOSPITAL | Age: 58
End: 2023-04-27
Attending: INTERNAL MEDICINE
Payer: COMMERCIAL

## 2023-04-27 DIAGNOSIS — I10 ESSENTIAL HYPERTENSION: ICD-10-CM

## 2023-04-27 DIAGNOSIS — R63.2 BINGE EATING: ICD-10-CM

## 2023-04-27 PROCEDURE — 93010 ELECTROCARDIOGRAM REPORT: CPT | Performed by: INTERNAL MEDICINE

## 2023-04-27 PROCEDURE — 93005 ELECTROCARDIOGRAM TRACING: CPT

## 2023-04-28 LAB
ATRIAL RATE: 84 BPM
P AXIS: 44 DEGREES
P-R INTERVAL: 148 MS
Q-T INTERVAL: 394 MS
QRS DURATION: 90 MS
QTC CALCULATION (BEZET): 465 MS
R AXIS: -17 DEGREES
T AXIS: 26 DEGREES
VENTRICULAR RATE: 84 BPM

## 2023-06-23 ENCOUNTER — OFFICE VISIT (OUTPATIENT)
Dept: SURGERY | Facility: CLINIC | Age: 58
End: 2023-06-23
Payer: COMMERCIAL

## 2023-06-23 VITALS
WEIGHT: 166.31 LBS | HEIGHT: 65 IN | BODY MASS INDEX: 27.71 KG/M2 | SYSTOLIC BLOOD PRESSURE: 133 MMHG | HEART RATE: 87 BPM | OXYGEN SATURATION: 97 % | DIASTOLIC BLOOD PRESSURE: 95 MMHG

## 2023-06-23 DIAGNOSIS — E66.3 OVERWEIGHT (BMI 25.0-29.9): ICD-10-CM

## 2023-06-23 DIAGNOSIS — I10 ESSENTIAL HYPERTENSION: Primary | ICD-10-CM

## 2023-06-23 DIAGNOSIS — R63.2 BINGE EATING: ICD-10-CM

## 2023-06-23 DIAGNOSIS — Z51.81 ENCOUNTER FOR THERAPEUTIC DRUG MONITORING: ICD-10-CM

## 2023-06-23 DIAGNOSIS — K76.0 FATTY LIVER: ICD-10-CM

## 2023-06-23 PROCEDURE — 3008F BODY MASS INDEX DOCD: CPT | Performed by: INTERNAL MEDICINE

## 2023-06-23 PROCEDURE — 3080F DIAST BP >= 90 MM HG: CPT | Performed by: INTERNAL MEDICINE

## 2023-06-23 PROCEDURE — 3075F SYST BP GE 130 - 139MM HG: CPT | Performed by: INTERNAL MEDICINE

## 2023-06-23 PROCEDURE — 99214 OFFICE O/P EST MOD 30 MIN: CPT | Performed by: INTERNAL MEDICINE

## 2023-06-23 RX ORDER — HYDROCHLOROTHIAZIDE 12.5 MG/1
12.5 CAPSULE, GELATIN COATED ORAL DAILY
Qty: 90 CAPSULE | Refills: 0 | Status: SHIPPED | OUTPATIENT
Start: 2023-06-23

## 2023-09-18 ENCOUNTER — TELEMEDICINE (OUTPATIENT)
Dept: FAMILY MEDICINE CLINIC | Facility: CLINIC | Age: 58
End: 2023-09-18

## 2023-09-18 DIAGNOSIS — J01.90 ACUTE NON-RECURRENT SINUSITIS, UNSPECIFIED LOCATION: Primary | ICD-10-CM

## 2023-09-18 PROCEDURE — 99213 OFFICE O/P EST LOW 20 MIN: CPT

## 2023-09-18 RX ORDER — FLUTICASONE PROPIONATE 50 MCG
2 SPRAY, SUSPENSION (ML) NASAL DAILY
Qty: 16 G | Refills: 0 | Status: SHIPPED | OUTPATIENT
Start: 2023-09-18 | End: 2024-09-12

## 2023-09-18 RX ORDER — AMOXICILLIN AND CLAVULANATE POTASSIUM 875; 125 MG/1; MG/1
1 TABLET, FILM COATED ORAL 2 TIMES DAILY
Qty: 20 TABLET | Refills: 0 | Status: SHIPPED | OUTPATIENT
Start: 2023-09-18 | End: 2023-09-28

## 2023-09-25 RX ORDER — HYDROCHLOROTHIAZIDE 12.5 MG/1
12.5 CAPSULE, GELATIN COATED ORAL DAILY
Qty: 90 CAPSULE | Refills: 0 | Status: SHIPPED | OUTPATIENT
Start: 2023-09-25

## 2023-10-06 RX ORDER — LISDEXAMFETAMINE DIMESYLATE CAPSULES 50 MG/1
50 CAPSULE ORAL DAILY
Qty: 30 CAPSULE | Refills: 0 | Status: SHIPPED | OUTPATIENT
Start: 2023-10-06

## 2023-10-27 ENCOUNTER — OFFICE VISIT (OUTPATIENT)
Dept: SURGERY | Facility: CLINIC | Age: 58
End: 2023-10-27

## 2023-10-27 VITALS
HEART RATE: 78 BPM | BODY MASS INDEX: 26.16 KG/M2 | SYSTOLIC BLOOD PRESSURE: 138 MMHG | HEIGHT: 65 IN | OXYGEN SATURATION: 96 % | WEIGHT: 157 LBS | DIASTOLIC BLOOD PRESSURE: 74 MMHG

## 2023-10-27 DIAGNOSIS — K76.0 FATTY LIVER: ICD-10-CM

## 2023-10-27 DIAGNOSIS — I10 ESSENTIAL HYPERTENSION: Primary | ICD-10-CM

## 2023-10-27 DIAGNOSIS — E66.3 OVERWEIGHT (BMI 25.0-29.9): ICD-10-CM

## 2023-10-27 DIAGNOSIS — Z51.81 ENCOUNTER FOR THERAPEUTIC DRUG MONITORING: ICD-10-CM

## 2023-10-27 DIAGNOSIS — R63.2 BINGE EATING: ICD-10-CM

## 2023-10-27 PROCEDURE — 3078F DIAST BP <80 MM HG: CPT | Performed by: INTERNAL MEDICINE

## 2023-10-27 PROCEDURE — 3075F SYST BP GE 130 - 139MM HG: CPT | Performed by: INTERNAL MEDICINE

## 2023-10-27 PROCEDURE — 3008F BODY MASS INDEX DOCD: CPT | Performed by: INTERNAL MEDICINE

## 2023-10-27 PROCEDURE — 99214 OFFICE O/P EST MOD 30 MIN: CPT | Performed by: INTERNAL MEDICINE

## 2023-10-27 RX ORDER — LISDEXAMFETAMINE DIMESYLATE CAPSULES 60 MG/1
60 CAPSULE ORAL DAILY
Qty: 30 CAPSULE | Refills: 0 | Status: SHIPPED | OUTPATIENT
Start: 2023-12-28 | End: 2024-01-27

## 2023-10-27 RX ORDER — LISDEXAMFETAMINE DIMESYLATE CAPSULES 60 MG/1
60 CAPSULE ORAL DAILY
Qty: 30 CAPSULE | Refills: 0 | Status: SHIPPED | OUTPATIENT
Start: 2023-11-27 | End: 2023-12-27

## 2023-10-27 RX ORDER — LISDEXAMFETAMINE DIMESYLATE CAPSULES 60 MG/1
60 CAPSULE ORAL DAILY
Qty: 30 CAPSULE | Refills: 0 | Status: SHIPPED | OUTPATIENT
Start: 2023-10-27 | End: 2023-11-26

## 2023-12-26 RX ORDER — HYDROCHLOROTHIAZIDE 12.5 MG/1
12.5 CAPSULE, GELATIN COATED ORAL DAILY
Qty: 90 CAPSULE | Refills: 0 | Status: SHIPPED | OUTPATIENT
Start: 2023-12-26

## 2024-01-29 DIAGNOSIS — R63.2 BINGE EATING: ICD-10-CM

## 2024-01-29 RX ORDER — LISDEXAMFETAMINE DIMESYLATE CAPSULES 60 MG/1
60 CAPSULE ORAL DAILY
Qty: 30 CAPSULE | Refills: 0 | Status: SHIPPED | OUTPATIENT
Start: 2024-01-29

## 2024-02-29 ENCOUNTER — TELEPHONE (OUTPATIENT)
Dept: SURGERY | Facility: CLINIC | Age: 59
End: 2024-02-29

## 2024-02-29 DIAGNOSIS — R63.2 BINGE EATING: ICD-10-CM

## 2024-02-29 RX ORDER — LISDEXAMFETAMINE DIMESYLATE CAPSULES 60 MG/1
60 CAPSULE ORAL DAILY
Qty: 30 CAPSULE | Refills: 0 | Status: SHIPPED | OUTPATIENT
Start: 2024-02-29

## 2024-03-22 ENCOUNTER — OFFICE VISIT (OUTPATIENT)
Dept: SURGERY | Facility: CLINIC | Age: 59
End: 2024-03-22
Payer: COMMERCIAL

## 2024-03-22 VITALS
DIASTOLIC BLOOD PRESSURE: 82 MMHG | HEIGHT: 62 IN | SYSTOLIC BLOOD PRESSURE: 136 MMHG | OXYGEN SATURATION: 99 % | HEART RATE: 81 BPM | BODY MASS INDEX: 28.32 KG/M2 | WEIGHT: 153.88 LBS

## 2024-03-22 DIAGNOSIS — K76.0 FATTY LIVER: ICD-10-CM

## 2024-03-22 DIAGNOSIS — R63.2 BINGE EATING: ICD-10-CM

## 2024-03-22 DIAGNOSIS — I10 ESSENTIAL HYPERTENSION: Primary | ICD-10-CM

## 2024-03-22 DIAGNOSIS — E66.3 OVERWEIGHT (BMI 25.0-29.9): ICD-10-CM

## 2024-03-22 DIAGNOSIS — E55.9 VITAMIN D DEFICIENCY: ICD-10-CM

## 2024-03-22 DIAGNOSIS — Z51.81 ENCOUNTER FOR THERAPEUTIC DRUG MONITORING: ICD-10-CM

## 2024-03-22 PROCEDURE — 3079F DIAST BP 80-89 MM HG: CPT | Performed by: INTERNAL MEDICINE

## 2024-03-22 PROCEDURE — 3008F BODY MASS INDEX DOCD: CPT | Performed by: INTERNAL MEDICINE

## 2024-03-22 PROCEDURE — 99214 OFFICE O/P EST MOD 30 MIN: CPT | Performed by: INTERNAL MEDICINE

## 2024-03-22 PROCEDURE — 3075F SYST BP GE 130 - 139MM HG: CPT | Performed by: INTERNAL MEDICINE

## 2024-03-22 RX ORDER — HYDROCHLOROTHIAZIDE 12.5 MG/1
12.5 CAPSULE, GELATIN COATED ORAL DAILY
Qty: 90 CAPSULE | Refills: 0 | Status: SHIPPED | OUTPATIENT
Start: 2024-03-22

## 2024-03-22 RX ORDER — LISDEXAMFETAMINE DIMESYLATE CAPSULES 60 MG/1
60 CAPSULE ORAL DAILY
Qty: 30 CAPSULE | Refills: 0 | Status: SHIPPED | OUTPATIENT
Start: 2024-03-22

## 2024-03-22 NOTE — PROGRESS NOTES
[facility]  [address]  [place] [address]  Dept: [phone]  Loc: [phone]         Patient:  Graciela Rojas  :      3/30/1965  MRN:      [id number]    Chief Complaint:    Chief Complaint   Patient presents with    Follow - Up    Weight Management     Weight check        SUBJECTIVE     History of Present Illness:  Graciela is being seen today for a follow-up for non surgical weight loss    Past Medical History:   Past Medical History:   Diagnosis Date    Fatty liver     Hyperlipidemia     Overweight (BMI 25.0-29.9)     Unspecified essential hypertension         Comorbidities:  SOB/REBOLLAR-Improvement?  yes, Back pain-Improvement?  yes, Joint pain-Improvement?  yes, Hypertension-Improvement?  yes and Hyperlipidemia-Improvement?  yes    OBJECTIVE     Vitals: /82   Pulse 81   Ht 5' 2\" (1.575 m)   Wt 153 lb 14.4 oz (69.8 kg)   SpO2 99%   BMI 28.15 kg/m²     Initial weight loss: -04   Total weight loss: -70   Start weight: 224    Goal: 140    Wt Readings from Last 3 Encounters:   24 153 lb 14.4 oz (69.8 kg)   10/27/23 157 lb (71.2 kg)   23 166 lb 4.8 oz (75.4 kg)       Patient Medications:    Current Outpatient Medications   Medication Sig Dispense Refill    Lisdexamfetamine Dimesylate 60 MG Oral Cap Take 1 capsule (60 mg total) by mouth daily. 30 capsule 0    hydroCHLOROthiazide 12.5 MG Oral Cap Take 1 capsule (12.5 mg total) by mouth daily. 90 capsule 0     Allergies:  Patient has no known allergies.     Social History:    Social History     Socioeconomic History    Marital status:      Spouse name: Not on file    Number of children: Not on file    Years of education: Not on file    Highest education level: Not on file   Occupational History    Not on file   Tobacco Use    Smoking status: Never    Smokeless tobacco: Never   Substance and Sexual Activity    Alcohol use: Yes     Alcohol/week: 0.0 standard drinks of  alcohol     Comment: socially    Drug use: No    Sexual activity: Yes     Partners: Male   Other Topics Concern     Service Not Asked    Blood Transfusions Not Asked    Caffeine Concern No     Comment:  2 cups of coffee daily, soda    Occupational Exposure Not Asked    Hobby Hazards Not Asked    Sleep Concern Not Asked    Stress Concern Not Asked    Weight Concern Not Asked    Special Diet Not Asked    Back Care Not Asked    Exercise Yes     Comment: walking    Bike Helmet Not Asked    Seat Belt Not Asked    Self-Exams Not Asked    Grew up on a farm Not Asked    History of tanning Not Asked    Outdoor occupation Not Asked    Pt has a pacemaker No    Pt has a defibrillator No    Breast feeding Not Asked    Reaction to local anesthetic No   Social History Narrative    The patient does not use an assistive device..      The patient does live in a home with stairs.     Social Determinants of Health     Financial Resource Strain: Not on file   Food Insecurity: Not on file   Transportation Needs: Not on file   Physical Activity: Not on file   Stress: Not on file   Social Connections: Not on file   Housing Stability: Not on file     Surgical History:    Past Surgical History:   Procedure Laterality Date    REDUCTION OF LARGE BREAST       Family History:    Family History   Problem Relation Age of Onset    Heart Disease Father         CHF    Heart Disorder Other     Cancer Paternal Grandmother         colon  cancer       Food Journal  Reviewed and Discussed:       Patient has a Food Journal?: yes   Patient is reading nutrition labels?  yes  Average Caloric Intake:     Average CHO Intake: 100  Is patient exercising? yes  Type of exercise? Cardio   Walking around the block  [employer]    Eating Habits  Patient states the following:  Eats 3 meal(s) per day  Length of time it takes to consume a meal:  20  # of snacks per day: 1 Type of snacks:  Nuts, cheese sticks  Amount of soda consumption per day:  0  Amount of water  (in ounces) per day:  64  Drinking between meals only:  yes  Toughest challenge:    Nutritional Goals  Limit carbohydrates to 100 gms per day, Eat 100-200 calories within 1 hour of waking  and Eat 3-4 cups of fresh fruits or vegetables daily    Behavior Modifications Reviewed and Discussed  Eat breakfast, Eat 3 meals per day, Plan meals in advance, Read nutrition labels, Drink 64 oz of water per day, Maintain a daily food journal, No drinking 30 minutes before or after meals, Utlize portion control strategies to reduce calorie intake, Identify triggers for eating and manage cues and Eat slowly and take 20 to 30 minutes to complete each meal    Exercise Goals Reviewed and Discussed    Walk for  45 Minutes and Treadmill for  45 Minutes    ROS:    Constitutional: negative  Respiratory: negative  Cardiovascular: negative  Gastrointestinal: positive for constipation  Musculoskeletal:negative  Neurological: negative  Behavioral/Psych: negative  Endocrine: negative  All other systems were reviewed and are negative    Physical Exam:   General appearance: alert, appears stated age and cooperative  Head: Normocephalic, without obvious abnormality, atraumatic  Lungs: clear to auscultation bilaterally  Heart: S1, S2 normal, no murmur, click, rub or gallop, regular rate and rhythm  Abdomen: soft, non-tender; bowel sounds normal; no masses,  no organomegaly  Extremities: extremities normal, atraumatic, no cyanosis or edema  Pulses: 2+ and symmetric  Skin: Skin color, texture, turgor normal. No rashes or lesions    ASSESSMENT     HYPERTENSION:  The patient's blood pressure has been well controlled.  she has been checking it as instructed and has remained in relatively good control.    Encounter Diagnosis(ses):   Encounter Diagnoses   Name Primary?    Essential hypertension Yes    Binge eating     Encounter for therapeutic drug monitoring     Overweight (BMI 25.0-29.9)     Fatty liver     Vitamin D deficiency        PLAN     Orders  Placed This Encounter   Procedures    Vitamin D, 25-Hydroxy     Standing Status:   Future     Standing Expiration Date:   3/22/2025     Order Specific Question:   Please pick the scenario that best fits the purpose for ordering this test     Answer:   General Screening/Vit D deficiency [25(OH)D]     Order Specific Question:   Release to patient     Answer:   Immediate    Vitamin B12     Standing Status:   Future     Standing Expiration Date:   3/22/2025     Order Specific Question:   Release to patient     Answer:   Immediate     HYPERTENSION:  The patient's blood NOT pressure has been well controlled.  she has been checking it as instructed and has remained in relatively good control.    Binge eating: has improved with Vyvanse    Fatty liver: should improve with weight loss    Goals for next month:  1. Keep a food log.  2. Drink 48-64 ounces of non-caloric beverages per day. No fruit juices or regular soda.  3. Increase activity-upper body exercises, walk 10 minutes per day.  4. Increase fruit and vegetable servings to 5-6 per day.      HTN:    hydrochlorothiazide 12.5 mg    Scared of needles     Vyvanse: refill at 60 mg  ekg done    Walking outside      Orders Placed This Encounter   Procedures    Vitamin D, 25-Hydroxy     Standing Status:   Future     Standing Expiration Date:   3/22/2025     Order Specific Question:   Please pick the scenario that best fits the purpose for ordering this test     Answer:   General Screening/Vit D deficiency [25(OH)D]     Order Specific Question:   Release to patient     Answer:   Immediate    Vitamin B12     Standing Status:   Future     Standing Expiration Date:   3/22/2025     Order Specific Question:   Release to patient     Answer:   Immediate       Follow up with pcp    Yoni Ledbetter MD

## 2024-04-11 ENCOUNTER — OFFICE VISIT (OUTPATIENT)
Dept: FAMILY MEDICINE CLINIC | Facility: CLINIC | Age: 59
End: 2024-04-11
Payer: COMMERCIAL

## 2024-04-11 VITALS
SYSTOLIC BLOOD PRESSURE: 133 MMHG | RESPIRATION RATE: 18 BRPM | HEIGHT: 64 IN | BODY MASS INDEX: 26.29 KG/M2 | WEIGHT: 154 LBS | DIASTOLIC BLOOD PRESSURE: 80 MMHG | HEART RATE: 82 BPM

## 2024-04-11 DIAGNOSIS — I10 ESSENTIAL HYPERTENSION: ICD-10-CM

## 2024-04-11 DIAGNOSIS — Z12.31 VISIT FOR SCREENING MAMMOGRAM: ICD-10-CM

## 2024-04-11 DIAGNOSIS — Z00.00 ROUTINE PHYSICAL EXAMINATION: Primary | ICD-10-CM

## 2024-04-11 NOTE — PROGRESS NOTES
Subjective:   Patient ID: Graciela Rojas is a 59 year old female.    Patient is here for routine physical exam. No acute issues. Patient is requesting testing. Diet and exercise have been pretty good.   Past medical history, family history, and social history were reviewed.  Patient sees Dr Ledbetter for history of obesity. Has been doing well with current treatment.   Has had some aches and pains.         History/Other:   Review of Systems   Constitutional: Negative.  Negative for fever.   HENT: Negative.     Eyes: Negative.    Respiratory: Negative.  Negative for cough and shortness of breath.    Cardiovascular: Negative.  Negative for chest pain and palpitations.   Gastrointestinal: Negative.  Negative for abdominal pain.   Endocrine: Negative.    Genitourinary: Negative.  Negative for difficulty urinating and dysuria.   Musculoskeletal:  Positive for arthralgias (hands).   Skin: Negative.    Allergic/Immunologic: Negative.    Neurological: Negative.  Negative for dizziness and headaches.   Hematological: Negative.    Psychiatric/Behavioral: Negative.  Negative for dysphoric mood. The patient is not nervous/anxious.      Current Outpatient Medications   Medication Sig Dispense Refill    hydroCHLOROthiazide 12.5 MG Oral Cap Take 1 capsule (12.5 mg total) by mouth daily. 90 capsule 0    Lisdexamfetamine Dimesylate 60 MG Oral Cap Take 1 capsule (60 mg total) by mouth daily. 30 capsule 0     Allergies:No Known Allergies    Objective:   Physical Exam  Constitutional:       Appearance: Normal appearance. She is well-developed.   HENT:      Head: Normocephalic and atraumatic.      Right Ear: Tympanic membrane and external ear normal.      Left Ear: Tympanic membrane and external ear normal.      Nose: Nose normal.      Mouth/Throat:      Mouth: Mucous membranes are moist.      Pharynx: No posterior oropharyngeal erythema.   Eyes:      Conjunctiva/sclera: Conjunctivae normal.   Neck:      Thyroid: No thyroid mass,  thyromegaly or thyroid tenderness.   Cardiovascular:      Rate and Rhythm: Normal rate and regular rhythm.      Pulses: Normal pulses.      Heart sounds: Normal heart sounds.   Pulmonary:      Effort: Pulmonary effort is normal.      Breath sounds: Normal breath sounds.   Abdominal:      General: Abdomen is flat. There is no distension.      Palpations: Abdomen is soft.      Tenderness: There is no abdominal tenderness. There is no guarding or rebound.   Genitourinary:     Vagina: Normal.   Musculoskeletal:         General: Normal range of motion.      Cervical back: Normal range of motion and neck supple.   Lymphadenopathy:      Cervical: No cervical adenopathy.   Skin:     General: Skin is warm.   Neurological:      General: No focal deficit present.      Mental Status: She is alert and oriented to person, place, and time.      Deep Tendon Reflexes: Reflexes are normal and symmetric. Reflexes normal.   Psychiatric:         Mood and Affect: Mood normal.         Behavior: Behavior normal.         Thought Content: Thought content normal.         Judgment: Judgment normal.         Assessment & Plan:   1. Routine physical examination:  - Exam is unremarkable. Screening tests were discussed, and after discussion, will check lab work as below. Healthy diet, exercise, and weight were discussed. To call if problems and follow up and further management after testing. Routine follow up with Dr Ledbetter for weight management.     2. Essential hypertension:  - Stable, Will check lab work as below; Medication reviewed. Follow up and further management after testing. To monitor blood pressure; To call if any persistent elevation of blood pressure; Discussed good diet/activity; Routine follow up in 6-12 months or as needed.      3. Visit for screening mammogram:  - Mammogram ordered as discussed; Follow up and further management after testing         Orders Placed This Encounter   Procedures    Assay, Thyroid Stim Hormone    Lipid  Panel    Comp Metabolic Panel (14)    CBC, Platelet; No Differential       Meds This Visit:  Requested Prescriptions      No prescriptions requested or ordered in this encounter       Imaging & Referrals:  [facility] PRACHI 2D+3D SCREENING BILAT (CPT=77067/57232)

## 2024-04-12 ENCOUNTER — LAB ENCOUNTER (OUTPATIENT)
Dept: LAB | Facility: HOSPITAL | Age: 59
End: 2024-04-12
Attending: FAMILY MEDICINE
Payer: COMMERCIAL

## 2024-04-12 DIAGNOSIS — Z51.81 ENCOUNTER FOR THERAPEUTIC DRUG MONITORING: ICD-10-CM

## 2024-04-12 DIAGNOSIS — K76.0 FATTY LIVER: ICD-10-CM

## 2024-04-12 DIAGNOSIS — I10 ESSENTIAL HYPERTENSION: ICD-10-CM

## 2024-04-12 DIAGNOSIS — E55.9 VITAMIN D DEFICIENCY: ICD-10-CM

## 2024-04-12 DIAGNOSIS — R63.2 BINGE EATING: ICD-10-CM

## 2024-04-12 DIAGNOSIS — Z00.00 ROUTINE PHYSICAL EXAMINATION: ICD-10-CM

## 2024-04-12 DIAGNOSIS — E55.9 VITAMIN D DEFICIENCY: Primary | ICD-10-CM

## 2024-04-12 DIAGNOSIS — E66.3 OVERWEIGHT (BMI 25.0-29.9): ICD-10-CM

## 2024-04-12 LAB
ALBUMIN SERPL-MCNC: 4.8 G/DL (ref 3.2–4.8)
ALBUMIN/GLOB SERPL: 1.9 {RATIO} (ref 1–2)
ALP LIVER SERPL-CCNC: 51 U/L
ALT SERPL-CCNC: 29 U/L
ANION GAP SERPL CALC-SCNC: 5 MMOL/L (ref 0–18)
AST SERPL-CCNC: 26 U/L (ref ?–34)
BILIRUB SERPL-MCNC: 0.8 MG/DL (ref 0.2–1.1)
BUN BLD-MCNC: 11 MG/DL (ref 9–23)
BUN/CREAT SERPL: 15.1 (ref 10–20)
CALCIUM BLD-MCNC: 9.7 MG/DL (ref 8.7–10.4)
CHLORIDE SERPL-SCNC: 108 MMOL/L (ref 98–112)
CHOLEST SERPL-MCNC: 275 MG/DL (ref ?–200)
CO2 SERPL-SCNC: 27 MMOL/L (ref 21–32)
CREAT BLD-MCNC: 0.73 MG/DL
DEPRECATED RDW RBC AUTO: 42.9 FL (ref 35.1–46.3)
EGFRCR SERPLBLD CKD-EPI 2021: 94 ML/MIN/1.73M2 (ref 60–?)
ERYTHROCYTE [DISTWIDTH] IN BLOOD BY AUTOMATED COUNT: 11.9 % (ref 11–15)
FASTING PATIENT LIPID ANSWER: YES
FASTING STATUS PATIENT QL REPORTED: YES
GLOBULIN PLAS-MCNC: 2.5 G/DL (ref 2.8–4.4)
GLUCOSE BLD-MCNC: 91 MG/DL (ref 70–99)
HCT VFR BLD AUTO: 45.3 %
HDLC SERPL-MCNC: 92 MG/DL (ref 40–59)
HGB BLD-MCNC: 15.8 G/DL
LDLC SERPL CALC-MCNC: 168 MG/DL (ref ?–100)
MCH RBC QN AUTO: 34.1 PG (ref 26–34)
MCHC RBC AUTO-ENTMCNC: 34.9 G/DL (ref 31–37)
MCV RBC AUTO: 97.6 FL
NONHDLC SERPL-MCNC: 183 MG/DL (ref ?–130)
OSMOLALITY SERPL CALC.SUM OF ELEC: 289 MOSM/KG (ref 275–295)
PLATELET # BLD AUTO: 166 10(3)UL (ref 150–450)
POTASSIUM SERPL-SCNC: 3.7 MMOL/L (ref 3.5–5.1)
PROT SERPL-MCNC: 7.3 G/DL (ref 5.7–8.2)
RBC # BLD AUTO: 4.64 X10(6)UL
SODIUM SERPL-SCNC: 140 MMOL/L (ref 136–145)
TRIGL SERPL-MCNC: 92 MG/DL (ref 30–149)
TSI SER-ACNC: 2.34 MIU/ML (ref 0.55–4.78)
VIT B12 SERPL-MCNC: 590 PG/ML (ref 211–911)
VIT D+METAB SERPL-MCNC: 8.3 NG/ML (ref 30–100)
VLDLC SERPL CALC-MCNC: 18 MG/DL (ref 0–30)
WBC # BLD AUTO: 4.4 X10(3) UL (ref 4–11)

## 2024-04-12 PROCEDURE — 82607 VITAMIN B-12: CPT | Performed by: INTERNAL MEDICINE

## 2024-04-12 PROCEDURE — 82306 VITAMIN D 25 HYDROXY: CPT | Performed by: INTERNAL MEDICINE

## 2024-04-12 RX ORDER — ERGOCALCIFEROL 1.25 MG/1
50000 CAPSULE ORAL WEEKLY
Qty: 12 CAPSULE | Refills: 5 | Status: SHIPPED | OUTPATIENT
Start: 2024-04-12 | End: 2024-06-29

## 2024-04-29 DIAGNOSIS — R63.2 BINGE EATING: ICD-10-CM

## 2024-04-29 RX ORDER — LISDEXAMFETAMINE DIMESYLATE 60 MG/1
60 CAPSULE ORAL DAILY
Qty: 30 CAPSULE | Refills: 0 | Status: SHIPPED | OUTPATIENT
Start: 2024-04-29

## 2024-05-24 ENCOUNTER — HOSPITAL ENCOUNTER (OUTPATIENT)
Dept: MAMMOGRAPHY | Facility: HOSPITAL | Age: 59
Discharge: HOME | End: 2024-05-24
Attending: FAMILY MEDICINE

## 2024-05-24 DIAGNOSIS — Z12.31 VISIT FOR SCREENING MAMMOGRAM: ICD-10-CM

## 2024-05-24 PROCEDURE — 77067 SCR MAMMO BI INCL CAD: CPT | Performed by: FAMILY MEDICINE

## 2024-05-24 PROCEDURE — 77063 BREAST TOMOSYNTHESIS BI: CPT | Performed by: FAMILY MEDICINE

## 2024-05-28 ENCOUNTER — HOSPITAL ENCOUNTER (EMERGENCY)
Facility: HOSPITAL | Age: 59
Discharge: HOME | End: 2024-05-28

## 2024-05-28 ENCOUNTER — APPOINTMENT (OUTPATIENT)
Dept: CT IMAGING | Facility: HOSPITAL | Age: 59
End: 2024-05-28
Attending: NURSE PRACTITIONER

## 2024-05-28 ENCOUNTER — NURSE TRIAGE (OUTPATIENT)
Dept: FAMILY MEDICINE CLINIC | Facility: CLINIC | Age: 59
End: 2024-05-28

## 2024-05-28 VITALS
HEIGHT: 62 IN | WEIGHT: 150 LBS | HEART RATE: 83 BPM | SYSTOLIC BLOOD PRESSURE: 124 MMHG | BODY MASS INDEX: 27.6 KG/M2 | OXYGEN SATURATION: 97 % | RESPIRATION RATE: 16 BRPM | TEMPERATURE: 97 F | DIASTOLIC BLOOD PRESSURE: 90 MMHG

## 2024-05-28 DIAGNOSIS — K51.00 PANCOLITIS (HCC): Primary | ICD-10-CM

## 2024-05-28 LAB
ALBUMIN SERPL-MCNC: 4.2 G/DL (ref 3.2–4.8)
ALBUMIN/GLOB SERPL: 1.6 {RATIO} (ref 1–2)
ALP LIVER SERPL-CCNC: 52 U/L
ALT SERPL-CCNC: 32 U/L
ANION GAP SERPL CALC-SCNC: 11 MMOL/L (ref 0–18)
AST SERPL-CCNC: 35 U/L (ref ?–34)
BASOPHILS # BLD AUTO: 0.02 X10(3) UL (ref 0–0.2)
BASOPHILS NFR BLD AUTO: 0.3 %
BILIRUB SERPL-MCNC: 1.2 MG/DL (ref 0.2–1.1)
BUN BLD-MCNC: 10 MG/DL (ref 9–23)
BUN/CREAT SERPL: 13.2 (ref 10–20)
CALCIUM BLD-MCNC: 9.2 MG/DL (ref 8.7–10.4)
CHLORIDE SERPL-SCNC: 101 MMOL/L (ref 98–112)
CO2 SERPL-SCNC: 22 MMOL/L (ref 21–32)
CREAT BLD-MCNC: 0.76 MG/DL
DEPRECATED RDW RBC AUTO: 40.1 FL (ref 35.1–46.3)
EGFRCR SERPLBLD CKD-EPI 2021: 89 ML/MIN/1.73M2 (ref 60–?)
EOSINOPHIL # BLD AUTO: 0 X10(3) UL (ref 0–0.7)
EOSINOPHIL NFR BLD AUTO: 0 %
ERYTHROCYTE [DISTWIDTH] IN BLOOD BY AUTOMATED COUNT: 11.9 % (ref 11–15)
GLOBULIN PLAS-MCNC: 2.7 G/DL (ref 2–3.5)
GLUCOSE BLD-MCNC: 122 MG/DL (ref 70–99)
HCT VFR BLD AUTO: 45.4 %
HGB BLD-MCNC: 16.3 G/DL
IMM GRANULOCYTES # BLD AUTO: 0.03 X10(3) UL (ref 0–1)
IMM GRANULOCYTES NFR BLD: 0.5 %
LIPASE SERPL-CCNC: 61 U/L (ref 13–75)
LYMPHOCYTES # BLD AUTO: 0.35 X10(3) UL (ref 1–4)
LYMPHOCYTES NFR BLD AUTO: 5.9 %
MCH RBC QN AUTO: 33.1 PG (ref 26–34)
MCHC RBC AUTO-ENTMCNC: 35.9 G/DL (ref 31–37)
MCV RBC AUTO: 92.1 FL
MONOCYTES # BLD AUTO: 0.14 X10(3) UL (ref 0.1–1)
MONOCYTES NFR BLD AUTO: 2.4 %
NEUTROPHILS # BLD AUTO: 5.38 X10 (3) UL (ref 1.5–7.7)
NEUTROPHILS # BLD AUTO: 5.38 X10(3) UL (ref 1.5–7.7)
NEUTROPHILS NFR BLD AUTO: 90.9 %
OSMOLALITY SERPL CALC.SUM OF ELEC: 278 MOSM/KG (ref 275–295)
PLATELET # BLD AUTO: 165 10(3)UL (ref 150–450)
PLATELETS.RETICULATED NFR BLD AUTO: 4.6 % (ref 0–7)
POTASSIUM SERPL-SCNC: 2.8 MMOL/L (ref 3.5–5.1)
PROT SERPL-MCNC: 6.9 G/DL (ref 5.7–8.2)
RBC # BLD AUTO: 4.93 X10(6)UL
SODIUM SERPL-SCNC: 134 MMOL/L (ref 136–145)
WBC # BLD AUTO: 5.9 X10(3) UL (ref 4–11)

## 2024-05-28 PROCEDURE — 99284 EMERGENCY DEPT VISIT MOD MDM: CPT

## 2024-05-28 PROCEDURE — 96366 THER/PROPH/DIAG IV INF ADDON: CPT

## 2024-05-28 PROCEDURE — 83690 ASSAY OF LIPASE: CPT | Performed by: NURSE PRACTITIONER

## 2024-05-28 PROCEDURE — 74177 CT ABD & PELVIS W/CONTRAST: CPT | Performed by: NURSE PRACTITIONER

## 2024-05-28 PROCEDURE — 96361 HYDRATE IV INFUSION ADD-ON: CPT

## 2024-05-28 PROCEDURE — 80053 COMPREHEN METABOLIC PANEL: CPT

## 2024-05-28 PROCEDURE — 85025 COMPLETE CBC W/AUTO DIFF WBC: CPT

## 2024-05-28 PROCEDURE — 96375 TX/PRO/DX INJ NEW DRUG ADDON: CPT

## 2024-05-28 PROCEDURE — S0028 INJECTION, FAMOTIDINE, 20 MG: HCPCS | Performed by: NURSE PRACTITIONER

## 2024-05-28 PROCEDURE — 96365 THER/PROPH/DIAG IV INF INIT: CPT

## 2024-05-28 RX ORDER — ONDANSETRON 4 MG/1
4 TABLET, ORALLY DISINTEGRATING ORAL EVERY 4 HOURS PRN
Qty: 10 TABLET | Refills: 0 | Status: SHIPPED | OUTPATIENT
Start: 2024-05-28 | End: 2024-06-04

## 2024-05-28 RX ORDER — POTASSIUM CHLORIDE 20 MEQ/1
40 TABLET, EXTENDED RELEASE ORAL ONCE
Status: COMPLETED | OUTPATIENT
Start: 2024-05-28 | End: 2024-05-28

## 2024-05-28 RX ORDER — DICYCLOMINE HCL 20 MG
20 TABLET ORAL 4 TIMES DAILY PRN
Qty: 30 TABLET | Refills: 0 | Status: SHIPPED | OUTPATIENT
Start: 2024-05-28 | End: 2024-06-27

## 2024-05-28 RX ORDER — POTASSIUM CHLORIDE 750 MG/1
10 TABLET, EXTENDED RELEASE ORAL 2 TIMES DAILY
Qty: 14 TABLET | Refills: 0 | Status: SHIPPED | OUTPATIENT
Start: 2024-05-28 | End: 2024-06-04

## 2024-05-28 RX ORDER — FAMOTIDINE 10 MG/ML
20 INJECTION, SOLUTION INTRAVENOUS ONCE
Status: COMPLETED | OUTPATIENT
Start: 2024-05-28 | End: 2024-05-28

## 2024-05-28 RX ORDER — MORPHINE SULFATE 4 MG/ML
4 INJECTION, SOLUTION INTRAMUSCULAR; INTRAVENOUS ONCE
Status: COMPLETED | OUTPATIENT
Start: 2024-05-28 | End: 2024-05-28

## 2024-05-28 RX ORDER — POTASSIUM CHLORIDE 14.9 MG/ML
20 INJECTION INTRAVENOUS ONCE
Status: COMPLETED | OUTPATIENT
Start: 2024-05-28 | End: 2024-05-28

## 2024-05-28 RX ORDER — HYDROCODONE BITARTRATE AND ACETAMINOPHEN 7.5; 325 MG/1; MG/1
1-2 TABLET ORAL EVERY 6 HOURS PRN
Qty: 10 TABLET | Refills: 0 | Status: SHIPPED | OUTPATIENT
Start: 2024-05-28 | End: 2024-06-02

## 2024-05-28 NOTE — ED PROVIDER NOTES
Patient Seen in: [facility] Emergency Department      History     Chief Complaint   Patient presents with    Nausea/Vomiting/Diarrhea     Stated Complaint: NVD    Subjective:   60yo/m w hx of htn, hld reports to the ED w c/o generalized abdominal pain, nausea, vomiting, and diarrhea x 3 days. Worse w time. Nothing improves. No head, neck, back, chest pain. No cough or congestion. No recent abx use or travel. No flank pain. No urinary symptoms.             Objective:   Past Medical History:    Fatty liver    Hyperlipidemia    Overweight (BMI 25.0-29.9)    Unspecified essential hypertension              Past Surgical History:   Procedure Laterality Date    Reduction of large breast                  Social History     Socioeconomic History    Marital status:    Tobacco Use    Smoking status: Never    Smokeless tobacco: Never   Substance and Sexual Activity    Alcohol use: Yes     Alcohol/week: 0.0 standard drinks of alcohol     Comment: socially    Drug use: No    Sexual activity: Yes     Partners: Male   Other Topics Concern    Caffeine Concern No     Comment:  2 cups of coffee daily, soda    Exercise Yes     Comment: walking    Pt has a pacemaker No    Pt has a defibrillator No    Reaction to local anesthetic No   Social History Narrative    The patient does not use an assistive device..      The patient does live in a home with stairs.              Review of Systems   All other systems reviewed and are negative.      Positive for stated complaint: NVD  Other systems are as noted in HPI.  Constitutional and vital signs reviewed.      All other systems reviewed and negative except as noted above.    Physical Exam     ED Triage Vitals [05/28/24 1637]   /84   Pulse 87   Resp 16   Temp 96.9 °F (36.1 °C)   Temp src    SpO2 97 %   O2 Device None (Room air)       Current Vitals:   Vital Signs  BP: 144/84  Pulse: 87  Resp: 16  Temp: 96.9 °F (36.1 °C)    Oxygen Therapy  SpO2: 97 %  O2 Device: None (Room  air)            Physical Exam  Vitals and nursing note reviewed.   Constitutional:       General: She is not in acute distress.     Appearance: She is well-developed.   HENT:      Head: Normocephalic and atraumatic.      Nose: Nose normal.      Mouth/Throat:      Mouth: Mucous membranes are moist.   Eyes:      Conjunctiva/sclera: Conjunctivae normal.      Pupils: Pupils are equal, round, and reactive to light.   Cardiovascular:      Rate and Rhythm: Normal rate and regular rhythm.      Heart sounds: Normal heart sounds.   Pulmonary:      Effort: Pulmonary effort is normal.      Breath sounds: Normal breath sounds.   Abdominal:      General: Bowel sounds are normal.      Palpations: Abdomen is soft.   Musculoskeletal:         General: No tenderness or deformity. Normal range of motion.      Cervical back: Normal range of motion and neck supple.   Skin:     General: Skin is warm and dry.      Capillary Refill: Capillary refill takes less than 2 seconds.      Findings: No rash.      Comments: Normal color   Neurological:      General: No focal deficit present.      Mental Status: She is alert and oriented to person, place, and time.      GCS: GCS eye subscore is 4. GCS verbal subscore is 5. GCS motor subscore is 6.      Cranial Nerves: No cranial nerve deficit.      Gait: Gait normal.             ED Course     Labs Reviewed   COMP METABOLIC PANEL (14) - Abnormal; Notable for the following components:       Result Value    Glucose 122 (*)     Sodium 134 (*)     Potassium 2.8 (*)     AST 35 (*)     Bilirubin, Total 1.2 (*)     All other components within normal limits   CBC W/ DIFFERENTIAL - Abnormal; Notable for the following components:    HGB 16.3 (*)     Lymphocyte Absolute 0.35 (*)     All other components within normal limits   LIPASE - Normal   CBC WITH DIFFERENTIAL WITH PLATELET    Narrative:     The following orders were created for panel order CBC With Differential With Platelet.  Procedure                                Abnormality         Status                     ---------                               -----------         ------                     CBC W/ DIFFERENTIAL[[id number]]          Abnormal            Final result                 Please view results for these tests on the individual orders.   SCAN SLIDE           mpression  CONCLUSION:  1. Long segment/diffuse colonic wall thickening with pericolonic inflammatory stranding; findings are compatible with pan colitis, which is likely infectious or inflammatory in nature.  No free intraperitoneal air or well-defined/drainable  intra-abdominal collection.  Small volume free fluid in the pelvis may be reactive.  2. Circumferential urinary bladder wall thickening with nonspecific right greater than left ureteral urothelial thickening and enhancement.  Request urinalysis correlation to assess for potential cystitis and ascending urinary tract infection.  3. Multifocal right renal cortical scarring.  4. Lesser incidental findings as above.          Dictated by (CST): Eder Paris MD on 5/28/2024 at 7:42 PM      Finalized by (CST): Eder Paris MD on 5/28/2024 at 7:48 PM      MDM                         Medical Decision Making  58yo/f w hx and exam as stated; abd pain, n/v/d    Non toxic, well appearing  No chest pain  No cough  No flank pain  No urinary symptoms  Labs remarkable for hypokalemia  Otherwise non acute  Ct w pan colitis    Plan  Dc to home  Close fu  Supportive care  Return precautions        Amount and/or Complexity of Data Reviewed  Labs:  Decision-making details documented in ED Course.  Radiology:  Decision-making details documented in ED Course.    Risk  OTC drugs.  Prescription drug management.        Disposition and Plan     Clinical Impression:  1. Pancolitis (HCC)         Disposition:  Discharge  5/28/2024  8:24 pm    Follow-up:  Patel Guallpa MD  [address] [place] [id number] [phone]    Follow up in 2 day(s)      Mulugeta  MD Dez  [address] [place] [id number]  [phone]    Follow up in 2 day(s)            Medications Prescribed:  Current Discharge Medication List        START taking these medications    Details   ondansetron 4 MG Oral Tablet Dispersible Take 1 tablet (4 mg total) by mouth every 4 (four) hours as needed for Nausea.  Qty: 10 tablet, Refills: 0    Associated Diagnoses: Pancolitis (HCC)      dicyclomine 20 MG Oral Tab Take 1 tablet (20 mg total) by mouth 4 (four) times daily as needed.  Qty: 30 tablet, Refills: 0    Associated Diagnoses: Pancolitis (HCC)      HYDROcodone-acetaminophen 7.5-325 MG Oral Tab Take 1-2 tablets by mouth every 6 (six) hours as needed for Pain.  Qty: 10 tablet, Refills: 0    Associated Diagnoses: Pancolitis (HCC)      potassium chloride 10 MEQ Oral Tab CR Take 1 tablet (10 mEq total) by mouth 2 (two) times daily for 7 days.  Qty: 14 tablet, Refills: 0    Associated Diagnoses: Pancolitis (HCC)

## 2024-05-28 NOTE — TELEPHONE ENCOUNTER
Action Requested: Summary for Provider     []  Critical Lab, Recommendations Needed  [] Need Additional Advice  [x]   FYI    []   Need Orders  [] Need Medications Sent to Pharmacy  []  Other     SUMMARY: Per protocol, patient should go to ED/ICC.    Reason for call: Diarrhea (/)  Onset: 5/26    Patient reports of diarrhea and vomiting since Sunday evening. This has gotten worse she now feels shaky and weak. She has been having bowel movements almost every 15 minutes. Tried eating bananas and apple sauce but unable to keep anything down. Drinks water and Gatorade. Advised to go to ED/ICC for prompt evaluation and treatment. Patient verbalized understanding and agreed with plan of care.     Reason for Disposition   SEVERE diarrhea (e.g., 7 or more times / day more than normal) and age > 60 years    Protocols used: Diarrhea-A-OH

## 2024-05-28 NOTE — ED INITIAL ASSESSMENT (HPI)
Pt presents to ED with c/o n/v/d x 3 days + generalized abdominal pain   Denies known fevers or urinary symptoms

## 2024-05-29 DIAGNOSIS — R63.2 BINGE EATING: ICD-10-CM

## 2024-05-29 NOTE — ED QUICK NOTES
MD cleared patient for discharge. Patient provided with discharge paperwork and RN discussed plan of care. Patient given opportunity to ask any questions. MD prescribed 4 medications. Patient was in no apparent distress. Patient instructed to follow up with GI and PCP. Patient ambulated out of ED with steady gait.

## 2024-05-30 RX ORDER — LISDEXAMFETAMINE DIMESYLATE 60 MG/1
60 CAPSULE ORAL DAILY
Qty: 30 CAPSULE | Refills: 0 | Status: SHIPPED | OUTPATIENT
Start: 2024-05-30

## 2024-06-04 ENCOUNTER — OFFICE VISIT (OUTPATIENT)
Facility: CLINIC | Age: 59
End: 2024-06-04
Payer: COMMERCIAL

## 2024-06-04 ENCOUNTER — LAB ENCOUNTER (OUTPATIENT)
Dept: LAB | Facility: HOSPITAL | Age: 59
End: 2024-06-04
Attending: STUDENT IN AN ORGANIZED HEALTH CARE EDUCATION/TRAINING PROGRAM
Payer: COMMERCIAL

## 2024-06-04 VITALS
HEIGHT: 62 IN | WEIGHT: 148 LBS | DIASTOLIC BLOOD PRESSURE: 95 MMHG | BODY MASS INDEX: 27.23 KG/M2 | HEART RATE: 89 BPM | SYSTOLIC BLOOD PRESSURE: 159 MMHG

## 2024-06-04 DIAGNOSIS — E87.6 HYPOKALEMIA: Primary | ICD-10-CM

## 2024-06-04 DIAGNOSIS — E87.6 HYPOKALEMIA: ICD-10-CM

## 2024-06-04 DIAGNOSIS — R19.7 DIARRHEA, UNSPECIFIED TYPE: ICD-10-CM

## 2024-06-04 LAB
ALBUMIN SERPL-MCNC: 4.5 G/DL (ref 3.2–4.8)
ALBUMIN/GLOB SERPL: 1.9 {RATIO} (ref 1–2)
ALP LIVER SERPL-CCNC: 54 U/L
ALT SERPL-CCNC: 42 U/L
ANION GAP SERPL CALC-SCNC: 4 MMOL/L (ref 0–18)
AST SERPL-CCNC: 35 U/L (ref ?–34)
BILIRUB SERPL-MCNC: 0.6 MG/DL (ref 0.2–1.1)
BUN BLD-MCNC: 6 MG/DL (ref 9–23)
BUN/CREAT SERPL: 8.6 (ref 10–20)
CALCIUM BLD-MCNC: 9.7 MG/DL (ref 8.7–10.4)
CHLORIDE SERPL-SCNC: 106 MMOL/L (ref 98–112)
CO2 SERPL-SCNC: 28 MMOL/L (ref 21–32)
CREAT BLD-MCNC: 0.7 MG/DL
DEPRECATED RDW RBC AUTO: 41.7 FL (ref 35.1–46.3)
EGFRCR SERPLBLD CKD-EPI 2021: 98 ML/MIN/1.73M2 (ref 60–?)
ERYTHROCYTE [DISTWIDTH] IN BLOOD BY AUTOMATED COUNT: 11.9 % (ref 11–15)
FASTING STATUS PATIENT QL REPORTED: NO
GLOBULIN PLAS-MCNC: 2.4 G/DL (ref 2–3.5)
GLUCOSE BLD-MCNC: 94 MG/DL (ref 70–99)
HCT VFR BLD AUTO: 42.6 %
HGB BLD-MCNC: 15.1 G/DL
MCH RBC QN AUTO: 33.9 PG (ref 26–34)
MCHC RBC AUTO-ENTMCNC: 35.4 G/DL (ref 31–37)
MCV RBC AUTO: 95.5 FL
OSMOLALITY SERPL CALC.SUM OF ELEC: 283 MOSM/KG (ref 275–295)
PLATELET # BLD AUTO: 283 10(3)UL (ref 150–450)
POTASSIUM SERPL-SCNC: 3.9 MMOL/L (ref 3.5–5.1)
PROT SERPL-MCNC: 6.9 G/DL (ref 5.7–8.2)
RBC # BLD AUTO: 4.46 X10(6)UL
SODIUM SERPL-SCNC: 138 MMOL/L (ref 136–145)
WBC # BLD AUTO: 6.2 X10(3) UL (ref 4–11)

## 2024-06-04 PROCEDURE — 3080F DIAST BP >= 90 MM HG: CPT | Performed by: STUDENT IN AN ORGANIZED HEALTH CARE EDUCATION/TRAINING PROGRAM

## 2024-06-04 PROCEDURE — 85027 COMPLETE CBC AUTOMATED: CPT

## 2024-06-04 PROCEDURE — 3077F SYST BP >= 140 MM HG: CPT | Performed by: STUDENT IN AN ORGANIZED HEALTH CARE EDUCATION/TRAINING PROGRAM

## 2024-06-04 PROCEDURE — 3008F BODY MASS INDEX DOCD: CPT | Performed by: STUDENT IN AN ORGANIZED HEALTH CARE EDUCATION/TRAINING PROGRAM

## 2024-06-04 PROCEDURE — 80053 COMPREHEN METABOLIC PANEL: CPT

## 2024-06-04 PROCEDURE — 36415 COLL VENOUS BLD VENIPUNCTURE: CPT

## 2024-06-04 PROCEDURE — 99204 OFFICE O/P NEW MOD 45 MIN: CPT | Performed by: STUDENT IN AN ORGANIZED HEALTH CARE EDUCATION/TRAINING PROGRAM

## 2024-06-04 NOTE — H&P
[facility] - Gastroenterology                                                                                                               Reason for consult: ER follow up    Requesting physician or provider: Patel Guallpa MD    Chief Complaint   Patient presents with    Other     ER follow up.       HPI:   Graciela Rojas is a 59 year old year-old woman with history of diverticulosis, HLD, HTN who presents to GI clinic for ER follow up.    2 weeks ago the patient experienced acute onset abdominal pain, nausea, vomiting, diarrhea.  This persisted for 3 days and given that it did not let up she presented to the emergency department for further evaluation.  She had a CBC, CMP done that were largely unremarkable.  CT scan of the abdomen pelvis was done and showed diffuse colon wall thickening but not surrounding an area of diverticulosis, given the findings infectious etiology was felt most likely.    Prior to this, the patient felt well.  She was having regular/well-formed bowel movements and no blood in the stool.  Today in clinic, she feels completely better and her symptoms have resolved.  She has no abdominal pain, nausea, vomiting.  She is having regular well-formed bowel movements.    She did have a colonoscopy in 2020 that was unremarkable except for small colon polyps.    CT A/P 5/28/2024  Impression   CONCLUSION:  1. Long segment/diffuse colonic wall thickening with pericolonic inflammatory stranding; findings are compatible with pan colitis, which is likely infectious or inflammatory in nature.  No free intraperitoneal air or well-defined/drainable  intra-abdominal collection.  Small volume free fluid in the pelvis may be reactive.  2. Circumferential urinary bladder wall thickening with nonspecific right greater than left ureteral urothelial thickening and enhancement.  Request urinalysis correlation to assess  for potential cystitis and ascending urinary tract infection.  3. Multifocal right renal cortical scarring.  4. Lesser incidental findings as above.         Prior endoscopies:  7/2020  Colonoscopy with diminutive adenomas removed, diverticulosis (given 5 year recall    Soc:  -no smoking  -yes Etoh    Wt Readings from Last 6 Encounters:   06/04/24 148 lb (67.1 kg)   05/28/24 150 lb (68 kg)   04/11/24 154 lb (69.9 kg)   03/22/24 153 lb 14.4 oz (69.8 kg)   10/27/23 157 lb (71.2 kg)   06/23/23 166 lb 4.8 oz (75.4 kg)        History, Medications, Allergies, ROS:      Past Medical History:    Fatty liver    Hyperlipidemia    Overweight (BMI 25.0-29.9)    Unspecified essential hypertension      Past Surgical History:   Procedure Laterality Date    Reduction of large breast        Family Hx:   Family History   Problem Relation Age of Onset    Heart Disease Father         CHF    Heart Disorder Other     Cancer Paternal Grandmother         colon  cancer      Social History:   Social History     Socioeconomic History    Marital status:    Tobacco Use    Smoking status: Never    Smokeless tobacco: Never   Vaping Use    Vaping status: Never Used   Substance and Sexual Activity    Alcohol use: Yes     Alcohol/week: 0.0 standard drinks of alcohol     Comment: socially    Drug use: No    Sexual activity: Yes     Partners: Male   Other Topics Concern    Caffeine Concern No     Comment:  2 cups of coffee daily, soda    Exercise Yes     Comment: walking    Pt has a pacemaker No    Pt has a defibrillator No    Reaction to local anesthetic No   Social History Narrative    The patient does not use an assistive device..      The patient does live in a home with stairs.        Medications (Active prior to today's visit):  Current Outpatient Medications   Medication Sig Dispense Refill    Lisdexamfetamine Dimesylate 60 MG Oral Cap Take 1 capsule (60 mg total) by mouth daily. 30 capsule 0    ergocalciferol 1.25 MG (24439 UT) Oral Cap  Take 1 capsule (50,000 Units total) by mouth once a week for 12 doses. 12 capsule 5    hydroCHLOROthiazide 12.5 MG Oral Cap Take 1 capsule (12.5 mg total) by mouth daily. 90 capsule 0    ondansetron 4 MG Oral Tablet Dispersible Take 1 tablet (4 mg total) by mouth every 4 (four) hours as needed for Nausea. (Patient not taking: Reported on 6/4/2024) 10 tablet 0    dicyclomine 20 MG Oral Tab Take 1 tablet (20 mg total) by mouth 4 (four) times daily as needed. (Patient not taking: Reported on 6/4/2024) 30 tablet 0    potassium chloride 10 MEQ Oral Tab CR Take 1 tablet (10 mEq total) by mouth 2 (two) times daily for 7 days. (Patient not taking: Reported on 6/4/2024) 14 tablet 0       Allergies:  No Known Allergies    ROS:   CONSTITUTIONAL:  negative for fevers, rigors  EYES:  negative for diplopia   RESPIRATORY:  negative for severe shortness of breath  CARDIOVASCULAR:  negative for crushing sub-sternal chest pain  GASTROINTESTINAL:  see HPI  GENITOURINARY:  negative for dysuria or gross hematuria  INTEGUMENT/BREAST:  SKIN:  negative for jaundice   ALLERGIC/IMMUNOLOGIC:  negative for hay fever  ENDOCRINE:  negative for cold intolerance and heat intolerance  MUSCULOSKELETAL:  negative for joint effusion/severe erythema  BEHAVIOR/PSYCH:  negative for psychotic behavior      PHYSICAL EXAM:   Blood pressure (!) 159/95, pulse 89, height 5' 2\" (1.575 m), weight 148 lb (67.1 kg), not currently breastfeeding.    Gen- Patient appears comfortable and in no acute discomfort  HEENT: the sclera appears anicteric, oropharynx clear, mucus membranes appear moist  CV- regular rate and rhythm, the extremities are warm and well perfused   Lung- Moves air well; No labored breathing  Abdomen- soft, non-tender exam in all quadrants without rigidity or guarding, non-distended  Skin- No jaundice  Ext: no edema is evident.   Neuro- Alert and interactive, and gross movements of extremities normal  Psych - appropriate,  non-agitated    Labs/Imaging:     Patient's pertinent labs and imaging were reviewed and discussed with patient today.      ASSESSMENT/PLAN:   Graciela Rojas is a 59 year old year-old woman with history of diverticulosis, HLD, HTN who presents to GI clinic for ER follow up.    Patient is presenting for as an ER follow-up when she experienced acute onset nausea, vomiting, diarrhea.  She has CT scan abdomen pelvis that showed pancolitis that was felt to be infectious in etiology.    Her symptoms lasted approximately 4 to 5 days in total but since visit to the emergency department she feels back to normal.    Today she reports having no abdominal pain, nausea, vomiting or diarrhea.  She is now passing regular/formed stools.    Given that she had a colonoscopy in 2020 it was unremarkable except for diminutive colon polyps and prior to onset of symptoms she denied abdominal pain or diarrhea I suspect her symptoms were infectious in nature and have now resolved.    No further evaluation necessary at this time, however should her symptoms persist or come back we can repeat stool testing and proceed with colonoscopy.  Orders This Visit:  Orders Placed This Encounter   Procedures    Comp Metabolic Panel (14)    CBC, Platelet; No Differential       Meds This Visit:  Requested Prescriptions      No prescriptions requested or ordered in this encounter       Imaging & Referrals:  None         Benjamin Sanches MD  [facility] Gastroenterology  6/4/2024      This note was partially prepared using Dragon Medical voice recognition dictation software. As a result, errors may occur. When identified, these errors have been corrected. While every attempt is made to correct errors during dictation, discrepancies may still exist.

## 2024-06-06 ENCOUNTER — TELEPHONE (OUTPATIENT)
Facility: CLINIC | Age: 59
End: 2024-06-06

## 2024-06-06 NOTE — TELEPHONE ENCOUNTER
I called the patient to discuss lab results, she did not answer.    I left a message.    Potassium has returned to normal.    She has mildly elevated liver enzymes -- similar to 7 years ago.    Recent imaging of the abdomen showed a normal appearing liver.     Repeat labs in 6 months.    Gi staff:    Please recall patient to have CMP drawn in 6 months.    Thanks.        ============================    Message sent to pt outreach to repeat CMP in 6 months

## 2024-06-06 NOTE — TELEPHONE ENCOUNTER
I called the patient to discuss lab results, she did not answer.    I left a message.    Potassium has returned to normal.    She has mildly elevated liver enzymes -- similar to 7 years ago.    Recent imaging of the abdomen showed a normal appearing liver.     Repeat labs in 6 months.    Gi staff:    Please recall patient to have CMP drawn in 6 months.    Thanks.

## 2024-06-26 RX ORDER — HYDROCHLOROTHIAZIDE 12.5 MG/1
12.5 CAPSULE, GELATIN COATED ORAL DAILY
Qty: 90 CAPSULE | Refills: 0 | Status: SHIPPED | OUTPATIENT
Start: 2024-06-26

## 2024-07-02 DIAGNOSIS — R63.2 BINGE EATING: ICD-10-CM

## 2024-07-03 RX ORDER — LISDEXAMFETAMINE DIMESYLATE 60 MG/1
60 CAPSULE ORAL DAILY
Qty: 30 CAPSULE | Refills: 0 | Status: SHIPPED | OUTPATIENT
Start: 2024-07-03

## 2024-08-06 DIAGNOSIS — R63.2 BINGE EATING: ICD-10-CM

## 2024-08-06 RX ORDER — LISDEXAMFETAMINE DIMESYLATE 60 MG/1
60 CAPSULE ORAL DAILY
Qty: 30 CAPSULE | Refills: 0 | Status: SHIPPED | OUTPATIENT
Start: 2024-08-06

## 2024-09-05 DIAGNOSIS — R63.2 BINGE EATING: ICD-10-CM

## 2024-09-05 RX ORDER — LISDEXAMFETAMINE DIMESYLATE 60 MG/1
60 CAPSULE ORAL DAILY
Qty: 30 CAPSULE | Refills: 0 | Status: SHIPPED | OUTPATIENT
Start: 2024-09-05

## 2024-09-20 ENCOUNTER — OFFICE VISIT (OUTPATIENT)
Dept: SURGERY | Facility: CLINIC | Age: 59
End: 2024-09-20
Payer: COMMERCIAL

## 2024-09-20 VITALS
HEIGHT: 62 IN | DIASTOLIC BLOOD PRESSURE: 100 MMHG | OXYGEN SATURATION: 97 % | SYSTOLIC BLOOD PRESSURE: 142 MMHG | HEART RATE: 92 BPM | BODY MASS INDEX: 27.51 KG/M2 | WEIGHT: 149.5 LBS

## 2024-09-20 DIAGNOSIS — I10 ESSENTIAL HYPERTENSION: Primary | ICD-10-CM

## 2024-09-20 DIAGNOSIS — R63.2 BINGE EATING: ICD-10-CM

## 2024-09-20 DIAGNOSIS — E66.3 OVERWEIGHT (BMI 25.0-29.9): ICD-10-CM

## 2024-09-20 DIAGNOSIS — Z51.81 ENCOUNTER FOR THERAPEUTIC DRUG MONITORING: ICD-10-CM

## 2024-09-20 DIAGNOSIS — K76.0 FATTY LIVER: ICD-10-CM

## 2024-09-20 PROCEDURE — 3080F DIAST BP >= 90 MM HG: CPT | Performed by: INTERNAL MEDICINE

## 2024-09-20 PROCEDURE — 99214 OFFICE O/P EST MOD 30 MIN: CPT | Performed by: INTERNAL MEDICINE

## 2024-09-20 PROCEDURE — 3077F SYST BP >= 140 MM HG: CPT | Performed by: INTERNAL MEDICINE

## 2024-09-20 PROCEDURE — 3008F BODY MASS INDEX DOCD: CPT | Performed by: INTERNAL MEDICINE

## 2024-09-20 RX ORDER — LISDEXAMFETAMINE DIMESYLATE 60 MG/1
60 CAPSULE ORAL DAILY
Qty: 30 CAPSULE | Refills: 0 | Status: SHIPPED | OUTPATIENT
Start: 2024-11-21 | End: 2024-12-21

## 2024-09-20 RX ORDER — LISDEXAMFETAMINE DIMESYLATE 60 MG/1
60 CAPSULE ORAL DAILY
Qty: 30 CAPSULE | Refills: 0 | Status: SHIPPED | OUTPATIENT
Start: 2024-09-20 | End: 2024-10-20

## 2024-09-20 RX ORDER — LISDEXAMFETAMINE DIMESYLATE 60 MG/1
60 CAPSULE ORAL DAILY
Qty: 30 CAPSULE | Refills: 0 | Status: SHIPPED | OUTPATIENT
Start: 2024-10-21 | End: 2024-11-20

## 2024-09-20 RX ORDER — HYDROCHLOROTHIAZIDE 12.5 MG/1
12.5 CAPSULE ORAL DAILY
Qty: 90 CAPSULE | Refills: 0 | Status: SHIPPED | OUTPATIENT
Start: 2024-09-20

## 2024-09-20 NOTE — PROGRESS NOTES
[facility]– [place]note 2[place] BARIATRIC AND WEIGHT LOSS CLINIC  [address]  [facility] [address]  Dept: [phone]  Loc: [phone]         Patient:  Graciela Rojas  :      3/30/1965  MRN:      [id number]    Chief Complaint:    Chief Complaint   Patient presents with    Follow - Up    Weight Management       SUBJECTIVE     History of Present Illness:  Graciela is being seen today for a follow-up for non surgical weight loss    Past Medical History:   Past Medical History:    Fatty liver    Hyperlipidemia    Overweight (BMI 25.0-29.9)    Unspecified essential hypertension        Comorbidities:  SOB/REBOLLAR-Improvement?  yes, Back pain-Improvement?  yes, Joint pain-Improvement?  yes, Hypertension-Improvement?  yes and Hyperlipidemia-Improvement?  yes    OBJECTIVE     Vitals: BP (!) 142/100   Pulse 92   Ht 5' 2\" (1.575 m)   Wt 149 lb 8 oz (67.8 kg)   SpO2 97%   BMI 27.34 kg/m²     Initial weight loss: -04   Total weight loss: -74   Start weight: 224    Goal: 140    Wt Readings from Last 3 Encounters:   24 149 lb 8 oz (67.8 kg)   24 148 lb (67.1 kg)   24 150 lb (68 kg)       Patient Medications:    Current Outpatient Medications   Medication Sig Dispense Refill    Lisdexamfetamine Dimesylate 60 MG Oral Cap Take 1 capsule (60 mg total) by mouth daily. 30 capsule 0    HYDROCHLOROTHIAZIDE 12.5 MG Oral Cap TAKE ONE CAPSULE BY MOUTH ONE TIME DAILY 90 capsule 0     Allergies:  Patient has no known allergies.     Social History:    Social History     Socioeconomic History    Marital status:      Spouse name: Not on file    Number of children: Not on file    Years of education: Not on file    Highest education level: Not on file   Occupational History    Not on file   Tobacco Use    Smoking status: Never    Smokeless tobacco: Never   Vaping Use    Vaping status: Never Used   Substance and Sexual Activity    Alcohol use: Yes     Alcohol/week: 0.0 standard drinks of alcohol      Comment: socially    Drug use: No    Sexual activity: Yes     Partners: Male   Other Topics Concern     Service Not Asked    Blood Transfusions Not Asked    Caffeine Concern No     Comment:  2 cups of coffee daily, soda    Occupational Exposure Not Asked    Hobby Hazards Not Asked    Sleep Concern Not Asked    Stress Concern Not Asked    Weight Concern Not Asked    Special Diet Not Asked    Back Care Not Asked    Exercise Yes     Comment: walking    Bike Helmet Not Asked    Seat Belt Not Asked    Self-Exams Not Asked    Grew up on a farm Not Asked    History of tanning Not Asked    Outdoor occupation Not Asked    Pt has a pacemaker No    Pt has a defibrillator No    Breast feeding Not Asked    Reaction to local anesthetic No   Social History Narrative    The patient does not use an assistive device..      The patient does live in a home with stairs.     Social Determinants of Health     Financial Resource Strain: Not on file   Food Insecurity: Not on file   Transportation Needs: Not on file   Physical Activity: Not on file   Stress: Not on file   Social Connections: Not on file   Housing Stability: Not on file     Surgical History:    Past Surgical History:   Procedure Laterality Date    Reduction of large breast       Family History:    Family History   Problem Relation Age of Onset    Heart Disease Father         CHF    Heart Disorder Other     Cancer Paternal Grandmother         colon  cancer       Food Journal  Reviewed and Discussed:       Patient has a Food Journal?: yes   Patient is reading nutrition labels?  yes  Average Caloric Intake:     Average CHO Intake: 100  Is patient exercising? yes  Type of exercise? Cardio   Walking around the block  [employer]    Eating Habits  Patient states the following:  Eats 3 meal(s) per day  Length of time it takes to consume a meal:  20  # of snacks per day: 1 Type of snacks:  Nuts, cheese sticks  Amount of soda consumption per day:  0  Amount of water (in ounces)  per day:  64  Drinking between meals only:  yes  Toughest challenge:    Nutritional Goals  Limit carbohydrates to 100 gms per day, Eat 100-200 calories within 1 hour of waking  and Eat 3-4 cups of fresh fruits or vegetables daily    Behavior Modifications Reviewed and Discussed  Eat breakfast, Eat 3 meals per day, Plan meals in advance, Read nutrition labels, Drink 64 oz of water per day, Maintain a daily food journal, No drinking 30 minutes before or after meals, Utlize portion control strategies to reduce calorie intake, Identify triggers for eating and manage cues and Eat slowly and take 20 to 30 minutes to complete each meal    Exercise Goals Reviewed and Discussed    Walk for  45 Minutes and Treadmill for  45 Minutes    ROS:    Constitutional: negative  Respiratory: negative  Cardiovascular: negative  Gastrointestinal: positive for constipation  Musculoskeletal:negative  Neurological: negative  Behavioral/Psych: negative  Endocrine: negative  All other systems were reviewed and are negative    Physical Exam:   General appearance: alert, appears stated age and cooperative  Head: Normocephalic, without obvious abnormality, atraumatic  Lungs: clear to auscultation bilaterally  Heart: S1, S2 normal, no murmur, click, rub or gallop, regular rate and rhythm  Abdomen: soft, non-tender; bowel sounds normal; no masses,  no organomegaly  Extremities: extremities normal, atraumatic, no cyanosis or edema  Pulses: 2+ and symmetric  Skin: Skin color, texture, turgor normal. No rashes or lesions    ASSESSMENT     HYPERTENSION:  The patient's blood pressure has been well controlled.  she has been checking it as instructed and has remained in relatively good control.    Encounter Diagnosis(ses):   Encounter Diagnoses   Name Primary?    Essential hypertension Yes    Fatty liver     Encounter for therapeutic drug monitoring     Binge eating     Overweight (BMI 25.0-29.9)        PLAN     No orders of the defined types were placed  in this encounter.    HYPERTENSION:  The patient's blood NOT pressure has been well controlled.  she has been checking it as instructed and has remained in relatively good control.    Binge eating: has improved with Vyvanse    Fatty liver: should improve with weight loss    Goals for next month:  1. Keep a food log.  2. Drink 48-64 ounces of non-caloric beverages per day. No fruit juices or regular soda.  3. Increase activity-upper body exercises, walk 10 minutes per day.  4. Increase fruit and vegetable servings to 5-6 per day.      HTN:    hydrochlorothiazide 12.5 mg    Scared of needles     Vyvanse: refill at 60 mg  ekg done    Walking outside      No orders of the defined types were placed in this encounter.      Follow up with pcp    Yoni Ledbetter MD

## 2024-09-27 DIAGNOSIS — R63.2 BINGE EATING: ICD-10-CM

## 2024-09-27 RX ORDER — LISDEXAMFETAMINE DIMESYLATE 60 MG/1
60 CAPSULE ORAL DAILY
Qty: 30 CAPSULE | Refills: 0 | Status: SHIPPED | OUTPATIENT
Start: 2024-09-27 | End: 2024-10-27

## 2024-11-05 DIAGNOSIS — R63.2 BINGE EATING: ICD-10-CM

## 2024-11-05 RX ORDER — LISDEXAMFETAMINE DIMESYLATE 60 MG/1
60 CAPSULE ORAL DAILY
Qty: 30 CAPSULE | Refills: 0 | Status: SHIPPED | OUTPATIENT
Start: 2024-11-05 | End: 2024-12-05

## 2024-12-04 DIAGNOSIS — R63.2 BINGE EATING: ICD-10-CM

## 2024-12-04 RX ORDER — LISDEXAMFETAMINE DIMESYLATE 60 MG/1
60 CAPSULE ORAL DAILY
Qty: 30 CAPSULE | Refills: 0 | Status: SHIPPED | OUTPATIENT
Start: 2024-12-04 | End: 2025-01-03

## 2024-12-18 ENCOUNTER — TELEPHONE (OUTPATIENT)
Dept: GASTROENTEROLOGY | Facility: CLINIC | Age: 59
End: 2024-12-18

## 2024-12-18 DIAGNOSIS — R74.8 ELEVATED LIVER ENZYMES: Primary | ICD-10-CM

## 2025-01-02 DIAGNOSIS — R63.2 BINGE EATING: ICD-10-CM

## 2025-01-02 RX ORDER — LISDEXAMFETAMINE DIMESYLATE 60 MG/1
60 CAPSULE ORAL DAILY
Qty: 30 CAPSULE | Refills: 0 | Status: SHIPPED | OUTPATIENT
Start: 2025-01-02 | End: 2025-02-01

## 2025-02-04 DIAGNOSIS — R63.2 BINGE EATING: ICD-10-CM

## 2025-02-04 RX ORDER — LISDEXAMFETAMINE DIMESYLATE 60 MG/1
60 CAPSULE ORAL DAILY
Qty: 30 CAPSULE | Refills: 0 | Status: SHIPPED | OUTPATIENT
Start: 2025-02-04 | End: 2025-03-06

## 2025-03-07 ENCOUNTER — OFFICE VISIT (OUTPATIENT)
Dept: SURGERY | Facility: CLINIC | Age: 60
End: 2025-03-07
Payer: COMMERCIAL

## 2025-03-07 VITALS
DIASTOLIC BLOOD PRESSURE: 88 MMHG | HEIGHT: 62 IN | WEIGHT: 155 LBS | HEART RATE: 82 BPM | RESPIRATION RATE: 16 BRPM | SYSTOLIC BLOOD PRESSURE: 110 MMHG | BODY MASS INDEX: 28.52 KG/M2 | OXYGEN SATURATION: 98 %

## 2025-03-07 DIAGNOSIS — R63.2 BINGE EATING: Primary | ICD-10-CM

## 2025-03-07 PROCEDURE — 99214 OFFICE O/P EST MOD 30 MIN: CPT | Performed by: INTERNAL MEDICINE

## 2025-03-07 PROCEDURE — 3008F BODY MASS INDEX DOCD: CPT | Performed by: INTERNAL MEDICINE

## 2025-03-07 PROCEDURE — 3079F DIAST BP 80-89 MM HG: CPT | Performed by: INTERNAL MEDICINE

## 2025-03-07 PROCEDURE — 3074F SYST BP LT 130 MM HG: CPT | Performed by: INTERNAL MEDICINE

## 2025-03-07 RX ORDER — LISDEXAMFETAMINE DIMESYLATE 70 MG/1
70 CAPSULE ORAL DAILY
Qty: 30 CAPSULE | Refills: 0 | Status: SHIPPED | OUTPATIENT
Start: 2025-04-07 | End: 2025-05-07

## 2025-03-07 RX ORDER — LISDEXAMFETAMINE DIMESYLATE 70 MG/1
70 CAPSULE ORAL DAILY
Qty: 30 CAPSULE | Refills: 0 | Status: SHIPPED | OUTPATIENT
Start: 2025-03-07 | End: 2025-04-06

## 2025-03-07 RX ORDER — LISDEXAMFETAMINE DIMESYLATE 70 MG/1
70 CAPSULE ORAL EVERY MORNING
Qty: 30 CAPSULE | Refills: 0 | Status: SHIPPED | OUTPATIENT
Start: 2025-05-08 | End: 2025-06-07

## 2025-03-07 NOTE — PROGRESS NOTES
[facility] BARIATRIC AND WEIGHT LOSS CLINIC  [address]  [facility] [address]  Dept: [phone]  Loc: [phone]         Patient:  Graciela Rojas  :      3/30/1965  MRN:      [id number]    Chief Complaint:    Chief Complaint   Patient presents with    Follow - Up    Weight Management       SUBJECTIVE     History of Present Illness:  Graciela is being seen today for a follow-up for non surgical weight loss    Past Medical History:   Past Medical History:    Fatty liver    Hyperlipidemia    Overweight (BMI 25.0-29.9)    Unspecified essential hypertension        Comorbidities:  SOB/REBOLLAR-Improvement?  yes, Back pain-Improvement?  yes, Joint pain-Improvement?  yes, Hypertension-Improvement?  yes and Hyperlipidemia-Improvement?  yes    OBJECTIVE     Vitals: /88   Pulse 82   Resp 16   Ht 5' 2\" (1.575 m)   Wt 155 lb (70.3 kg)   SpO2 98%   BMI 28.35 kg/m²     Initial weight loss: +06   Total weight loss: -69   Start weight: 224    Goal: 140    Wt Readings from Last 3 Encounters:   25 155 lb (70.3 kg)   24 149 lb 8 oz (67.8 kg)   24 148 lb (67.1 kg)       Patient Medications:    Current Outpatient Medications   Medication Sig Dispense Refill    hydroCHLOROthiazide 12.5 MG Oral Cap Take 1 capsule (12.5 mg total) by mouth daily. 90 capsule 0     Allergies:  Patient has no known allergies.     Social History:    Social History     Socioeconomic History    Marital status:      Spouse name: Not on file    Number of children: Not on file    Years of education: Not on file    Highest education level: Not on file   Occupational History    Not on file   Tobacco Use    Smoking status: Never    Smokeless tobacco: Never   Vaping Use    Vaping status: Never Used   Substance and Sexual Activity    Alcohol use: Yes     Alcohol/week: 0.0 standard drinks of alcohol     Comment: socially    Drug use: No    Sexual activity: Yes     Partners: Male   Other Topics Concern      Service Not Asked    Blood Transfusions Not Asked    Caffeine Concern No     Comment:  2 cups of coffee daily, soda    Occupational Exposure Not Asked    Hobby Hazards Not Asked    Sleep Concern Not Asked    Stress Concern Not Asked    Weight Concern Not Asked    Special Diet Not Asked    Back Care Not Asked    Exercise Yes     Comment: walking    Bike Helmet Not Asked    Seat Belt Not Asked    Self-Exams Not Asked    Grew up on a farm Not Asked    History of tanning Not Asked    Outdoor occupation Not Asked    Pt has a pacemaker No    Pt has a defibrillator No    Breast feeding Not Asked    Reaction to local anesthetic No   Social History Narrative    The patient does not use an assistive device..      The patient does live in a home with stairs.     Social Drivers of Health     Food Insecurity: Not on file   Transportation Needs: Not on file   Stress: Not on file   Housing Stability: Not on file     Surgical History:    Past Surgical History:   Procedure Laterality Date    Reduction of large breast       Family History:    Family History   Problem Relation Age of Onset    Heart Disease Father         CHF    Heart Disorder Other     Cancer Paternal Grandmother         colon  cancer       Food Journal  Reviewed and Discussed:       Patient has a Food Journal?: yes   Patient is reading nutrition labels?  yes  Average Caloric Intake:     Average CHO Intake: 100  Is patient exercising? yes  Type of exercise? Cardio   Walking around the block  [employer]    Eating Habits  Patient states the following:  Eats 3 meal(s) per day  Length of time it takes to consume a meal:  20  # of snacks per day: 1 Type of snacks:  Nuts, cheese sticks  Amount of soda consumption per day:  0  Amount of water (in ounces) per day:  64  Drinking between meals only:  yes  Toughest challenge:    Nutritional Goals  Limit carbohydrates to 100 gms per day, Eat 100-200 calories within 1 hour of waking  and Eat 3-4 cups of fresh fruits or  vegetables daily    Behavior Modifications Reviewed and Discussed  Eat breakfast, Eat 3 meals per day, Plan meals in advance, Read nutrition labels, Drink 64 oz of water per day, Maintain a daily food journal, No drinking 30 minutes before or after meals, Utlize portion control strategies to reduce calorie intake, Identify triggers for eating and manage cues and Eat slowly and take 20 to 30 minutes to complete each meal    Exercise Goals Reviewed and Discussed    Walk for  45 Minutes and Treadmill for  45 Minutes    ROS:    Constitutional: negative  Respiratory: negative  Cardiovascular: negative  Gastrointestinal: positive for constipation  Musculoskeletal:negative  Neurological: negative  Behavioral/Psych: negative  Endocrine: negative  All other systems were reviewed and are negative    Physical Exam:   General appearance: alert, appears stated age and cooperative  Head: Normocephalic, without obvious abnormality, atraumatic  Lungs: clear to auscultation bilaterally  Heart: S1, S2 normal, no murmur, click, rub or gallop, regular rate and rhythm  Abdomen: soft, non-tender; bowel sounds normal; no masses,  no organomegaly  Extremities: extremities normal, atraumatic, no cyanosis or edema  Pulses: 2+ and symmetric  Skin: Skin color, texture, turgor normal. No rashes or lesions    ASSESSMENT     HYPERTENSION:  The patient's blood pressure has been well controlled.  she has been checking it as instructed and has remained in relatively good control.    Encounter Diagnosis(ses):   No diagnosis found.      PLAN     No orders of the defined types were placed in this encounter.    HYPERTENSION:  The patient's blood NOT pressure has been well controlled.  she has been checking it as instructed and has remained in relatively good control.    Binge eating: has improved with Vyvanse    Fatty liver: should improve with weight loss    Goals for next month:  1. Keep a food log.  2. Drink 48-64 ounces of non-caloric beverages per  day. No fruit juices or regular soda.  3. Increase activity-upper body exercises, walk 10 minutes per day.  4. Increase fruit and vegetable servings to 5-6 per day.      HTN:    hydrochlorothiazide 12.5 mg PRN    Scared of needles     Vyvanse: refill at 60 mg  ekg done    Walking outside    No orders of the defined types were placed in this encounter.      Follow up with pcp    Yoni Ledbetter MD

## 2025-03-24 NOTE — PROGRESS NOTES
[facility] AND WEIGHT LOSS CLINIC  [address] [place] [id number]  Dept: [phone]  Loc: [phone]     Date:   2016    Patient:  Sharri Erickson  :      3/30/1965  MRN:      [id number]    Chief Comp Reaction to local anesthetic No     Social History Narrative    The patient does not use an assistive device. .      The patient does live in a home with stairs.          Surgical History:    Past Surgical History:  No date: REDUCTION OF LARGE BREAST  Family reviewed and are negative    Physical Exam:   General appearance: alert, appears stated age and cooperative  Head: Normocephalic, without obvious abnormality, atraumatic  Lungs: clear to auscultation bilaterally  Heart: S1, S2 normal, no murmur, click, rub Unknown if ever smoked

## 2025-05-28 ENCOUNTER — OFFICE VISIT (OUTPATIENT)
Dept: DERMATOLOGY CLINIC | Facility: CLINIC | Age: 60
End: 2025-05-28
Payer: COMMERCIAL

## 2025-05-28 DIAGNOSIS — L81.4 LENTIGO: ICD-10-CM

## 2025-05-28 DIAGNOSIS — D22.9 MULTIPLE NEVI: ICD-10-CM

## 2025-05-28 DIAGNOSIS — L82.1 SK (SEBORRHEIC KERATOSIS): ICD-10-CM

## 2025-05-28 DIAGNOSIS — D23.9 BENIGN NEOPLASM OF SKIN, UNSPECIFIED LOCATION: ICD-10-CM

## 2025-05-28 DIAGNOSIS — Z12.83 SKIN CANCER SCREENING: ICD-10-CM

## 2025-05-28 DIAGNOSIS — D48.5 NEOPLASM OF UNCERTAIN BEHAVIOR OF SKIN: Primary | ICD-10-CM

## 2025-05-28 PROCEDURE — 88305 TISSUE EXAM BY PATHOLOGIST: CPT | Performed by: DERMATOLOGY

## 2025-05-28 PROCEDURE — 11103 TANGNTL BX SKIN EA SEP/ADDL: CPT | Performed by: DERMATOLOGY

## 2025-05-28 PROCEDURE — 99203 OFFICE O/P NEW LOW 30 MIN: CPT | Performed by: DERMATOLOGY

## 2025-05-28 PROCEDURE — 11102 TANGNTL BX SKIN SINGLE LES: CPT | Performed by: DERMATOLOGY

## 2025-06-01 NOTE — PROGRESS NOTES
The pathology report from last visit showed    A.  Skin, mid back, shave biopsy:  - Seborrheic keratosis, irritated and inflamed.     B.  Skin, right upper eyelid, shave biopsy:  - Seborrheic keratosis, irritated  Please log in test results, send biopsy results letter.  Pt to rtc 1 year or prn.

## 2025-06-02 NOTE — PROGRESS NOTES
Graciela Rojas is a 60 year old female.    CC:    Chief Complaint   Patient presents with    Full Skin Exam     NO hx of skin ca.  LOV 2017.  Pt presents for FBSE. Lesion(S) of concern to the   1. Back x 2 - itchiness and growing in size.          HISTORY:    Past Medical History[1]   Past Surgical History[2]   Family History[3]   Short Social Hx on File[4]     Current Medications[5]  Allergies:   Allergies[6]    Past Medical History[7]  Past Surgical History[8]  Social History     Socioeconomic History    Marital status:      Spouse name: Not on file    Number of children: Not on file    Years of education: Not on file    Highest education level: Not on file   Occupational History    Not on file   Tobacco Use    Smoking status: Never    Smokeless tobacco: Never   Vaping Use    Vaping status: Never Used   Substance and Sexual Activity    Alcohol use: Yes     Alcohol/week: 0.0 standard drinks of alcohol     Comment: socially    Drug use: No    Sexual activity: Yes     Partners: Male   Other Topics Concern     Service Not Asked    Blood Transfusions Not Asked    Caffeine Concern No     Comment:  2 cups of coffee daily, soda    Occupational Exposure Not Asked    Hobby Hazards Not Asked    Sleep Concern Not Asked    Stress Concern Not Asked    Weight Concern Not Asked    Special Diet Not Asked    Back Care Not Asked    Exercise Yes     Comment: walking    Bike Helmet Not Asked    Seat Belt Not Asked    Self-Exams Not Asked    Grew up on a farm No    History of tanning Yes    Outdoor occupation No    Pt has a pacemaker No    Pt has a defibrillator No    Breast feeding No    Reaction to local anesthetic No   Social History Narrative    The patient does not use an assistive device..      The patient does live in a home with stairs.     Social Drivers of Health     Food Insecurity: Not on file   Transportation Needs: Not on file   Stress: Not on file   Housing Stability: Not on file     Family  History[9]    HPI:     HPI:  Chief Complaint   Patient presents with    Full Skin Exam     NO hx of skin ca.  LOV 2017.  Pt presents for FBSE. Lesion(S) of concern to the   1. Back x 2 - itchiness and growing in size.        History of Present Illness  Graciela Rojas is a 60 year old female who presents for evaluation of skin lesions.    She has two spots on her back, one of which is of particular concern to her. The other spots are irritated and have a small blackhead underneath.    A small tag-like lesion is present on her left hairline.    There is a benign keratosis under her bra line.    She also mentions a lesion on her right eyelid that she would like to have removed.    Patient presents with concerns above.    Patient has been in their usual state of health.     Past notes/ records and appropriate/relevant lab results including pathology and past body maps reviewed. Including outside notes/ PCP notes as appropriate. Updated and new information noted in current visit.     ROS:  Denies other relevant systemic complaints. See HPI.     History, medications, allergies reviewed as noted.    Allergies:  Patient has no known allergies.      There were no vitals filed for this visit.    Physical Examination:     Well-developed well-nourished patient alert oriented in no acute distress.  Exam performed of appropriate involved areas    Physical Exam  SKIN: Lesion on skin recommended for removal. Benign keratosis, irritated, multiple spots. Seborrheic keratosis on shoulder. Skin tag on left hairline. Benign keratosis under bra line. Lesion on right eyelid recommended for removal.    Multiple light to medium brown, well marginated, uniformly pigmented, macules and papules 6 mm and less scattered on exam. pigmented lesions examined with dermoscopy benign-appearing patterns.     Waxy tannish keratotic papules scattered, cherry-red vascular papules scattered.    See map today's date for lesions noted .  See assessment and  plan below for specific lesions.    Otherwise remarkable for lesions as noted on map.    See A/P  below for additional information:    Assessment / plan:    Results  PATHOLOGY  Skin biopsy: benign keratosis    Orders Placed This Encounter   Procedures    Specimen to Pathology, Tissue [[facility] Pt to [facility] lab]       Meds & Refills for this Visit:  Requested Prescriptions      No prescriptions requested or ordered in this encounter         Encounter Diagnoses   Name Primary?    Neoplasm of uncertain behavior of skin Yes    SK (seborrheic keratosis)     Lentigo     Multiple nevi     Benign neoplasm of skin, unspecified location     Skin cancer screening        Assessment & Plan  Benign skin lesions  Multiple benign skin lesions identified, including seborrheic keratosis on the shoulder, a small tag on the left hairline, and benign keratosis under the bra line. One lesion on the back is recommended for removal due to its characteristics.  - Remove lesion on the back.  - Monitor seborrheic keratosis on the shoulder.  - Remove lesion on the right eyelid.                 A: mid back:keratotic tan brown papule growing,1.3cm tender irritated sk r/o other B: right upper eyelid: tan papule growing, irritated 3x5mm irritated sk r/o other,   Shave/ tangential biopsy performed, operative note and consent in chart further plans pending pathology      Benign-appearing nevi, no atypical features on dermoscopy reassurance given monitor.     Waxy tan keratotic papules lesions in areas of concern as noted reassurance given.  Benign nature discussed.  Possibility of cryo, alphahydroxy acids over-the-counter retinol's discussed.     No other susupicious lesions on todays  exam.    Please refer to map for specific lesions.  See additional diagnoses.  Pros cons of various therapies, risks benefits discussed.Pathophysiology, terapeutic options reviewed.  See  Medications in grid.  Instructions reviewed at length.    Benign nevi, seborrheic   keratoses, cherry angiomas:  Reassurance regarding other benign skin lesions.    Monitor for new or changing lesions. Signs and symptoms of skin cancer, ABCDE's of melanoma ( additional information available at [email or web address].org, [email or web address]) Encourage Sunscreen (broad-spectrum, ideally mineral-based-UVA/UVB -SPF 30 or higher) use encouraged, sun protection/sun protective clothing, self exams reviewed Followup as noted RTC ---routine checkup 6 mos -one year or p.r.n.    Encounter Times   Including precharting, reviewing chart, prior notes obtaining history: 10 minutes, medical exam :10 minutes, notes on body map, plan, counseling 10minutes My total time spent caring for the patient on the day of the encounter: 30 minutes     The patient indicates understanding of these issues and agrees to the plan.  The patient is asked to return as noted in follow-up/ above.    This note was generated using Dragon voice recognition software.  Please contact me regarding any confusion resulting from errors in recognition..  Note to patient and family: The 21st Century Cures Act makes medical notes like these available to patients. However, be advised this is a medical document. It is intended as [id number] communication and monitoring of a patient's care needs. It is written in medical language and may contain abbreviations or verbiage that are unfamiliar. It may appear blunt or direct. Medical documents are intended to carry relevant information, facts as evident and the clinical opinion of the practitioner.       [1]   Past Medical History:   Fatty liver    Hyperlipidemia    Overweight (BMI 25.0-29.9)    Unspecified essential hypertension   [2]   Past Surgical History:  Procedure Laterality Date    Reduction of large breast     [3]   Family History  Problem Relation Age of Onset    Heart Disease Father         CHF    Heart Disorder Other     Cancer Paternal Grandmother         colon  cancer   [4]   Social History  Socioeconomic  History    Marital status:    Tobacco Use    Smoking status: Never    Smokeless tobacco: Never   Vaping Use    Vaping status: Never Used   Substance and Sexual Activity    Alcohol use: Yes     Alcohol/week: 0.0 standard drinks of alcohol     Comment: socially    Drug use: No    Sexual activity: Yes     Partners: Male   Other Topics Concern    Caffeine Concern No     Comment:  2 cups of coffee daily, soda    Exercise Yes     Comment: walking    Grew up on a farm No    History of tanning Yes    Outdoor occupation No    Pt has a pacemaker No    Pt has a defibrillator No    Breast feeding No    Reaction to local anesthetic No   Social History Narrative    The patient does not use an assistive device..      The patient does live in a home with stairs.   [5]   Current Outpatient Medications   Medication Sig Dispense Refill    Lisdexamfetamine Dimesylate (VYVANSE) 70 MG Oral Cap Take 1 capsule (70 mg total) by mouth every morning. 30 capsule 0    hydroCHLOROthiazide 12.5 MG Oral Cap Take 1 capsule (12.5 mg total) by mouth daily. 90 capsule 0   [6] No Known Allergies  [7]   Past Medical History:   Fatty liver    Hyperlipidemia    Overweight (BMI 25.0-29.9)    Unspecified essential hypertension   [8]   Past Surgical History:  Procedure Laterality Date    Reduction of large breast     [9]   Family History  Problem Relation Age of Onset    Heart Disease Father         CHF    Heart Disorder Other     Cancer Paternal Grandmother         colon  cancer

## 2025-06-02 NOTE — PROGRESS NOTES
Operative Report                     Shave/  Tangential biopsy     Clinical diagnosis:    Size of lesion:   A: mid back:keratotic tan brown papule growing,1.3cm tender irritated sk r/o other B: right upper eyelid: tan papule growing, irritated 3x5mm irritated sk r/o other,   Location:    Procedure:    With patient in appropriate position the skin of the above was scrubbed with alcohol.  Anesthesia was obtained with 1% Xylocaine with epinephrine.  The skin surrounding the lesion was placed under tension and the lesion was incised using a #15 scalpel blade.  The specimen was sent for histopathologic exam.    Hemostasis was obtained with electrocautery/aluminum chloride.  Estimated blood loss less than 2 cc.    Biopsy dressed with Polysporin, bandage.    Pressure dressing:   No    Complications: None    Written instructions given and reviewed with patient    Await pathology    Contact information reviewed.    Procedural physician:  Jeni Nguyen MD

## 2025-06-11 DIAGNOSIS — R63.2 BINGE EATING: ICD-10-CM

## 2025-06-11 RX ORDER — LISDEXAMFETAMINE DIMESYLATE 70 MG/1
70 CAPSULE ORAL EVERY MORNING
Qty: 30 CAPSULE | Refills: 0 | Status: SHIPPED | OUTPATIENT
Start: 2025-06-11

## 2025-07-18 DIAGNOSIS — R63.2 BINGE EATING: ICD-10-CM

## 2025-07-21 RX ORDER — LISDEXAMFETAMINE DIMESYLATE 70 MG/1
70 CAPSULE ORAL EVERY MORNING
Qty: 30 CAPSULE | Refills: 0 | Status: SHIPPED | OUTPATIENT
Start: 2025-07-21

## 2025-08-25 DIAGNOSIS — R63.2 BINGE EATING: ICD-10-CM

## 2025-08-25 RX ORDER — LISDEXAMFETAMINE DIMESYLATE 70 MG/1
70 CAPSULE ORAL EVERY MORNING
Qty: 30 CAPSULE | Refills: 0 | Status: SHIPPED | OUTPATIENT
Start: 2025-08-25

## (undated) NOTE — MR AVS SNAPSHOT
[address] [facility]  [facility] [phone]               Thank you for choosing us for your health care visit with Mallorie Nichole MD.  We are glad to serve you and happy to provide you with th Comp Metabolic Panel (14)    Complete by:  Jan 13, 2017 (Approximate)    Assoc Dx:  Essential hypertension [I10], Binge eating [R63.2], Fatty liver [K76.0], Encounter for therapeutic drug monitoring [Z51.81], Obesity (BMI 30-39. 9) [E66.9]           Danisha Educational Information     Healthy Diet and Regular Exercise  [facility] [address] for making healthy food choices  -   Enjoy your food, but eat less. Fully enjoy your food when eating. Don’t eat while distracted and slow down.    Avoid

## (undated) NOTE — MR AVS SNAPSHOT
[facility] SPECIALTY [facility] - [place] [address] [address] [address] [phone]               Thank you for choosing us for your health care visit with Nestor Goodpasture, MD.  We are glad to serve you and happy to provide you with this summary of y MyChart     Visit [employer]  You can access your MyChart to more actively manage your health care and view more details from this visit by going to [email or web address].   If you've recently had a stay at the Hospital you can access your discharge ins

## (undated) NOTE — MR AVS SNAPSHOT
[facility] [address] [place] [address] [phone]               Thank you for choosing us for your health care visit with Lyn Anna MD.  We are glad to serve you and happy to provide you with this summary of your v You can access your MyChart to more actively manage your health care and view more details from this visit by going to [email or web address].   If you've recently had a stay at the Hospital you can access your discharge instructions in [address] by gaurav

## (undated) NOTE — Clinical Note
3/11/2017              Blu Uriarte        [address]         Dear Daren Wooten,    This letter is to inform you that our office has made several attempts to reach you by phone without success.   We were attempting to contact yo

## (undated) NOTE — MR AVS SNAPSHOT
[address]                Thank you for choosing us for your health care visit with Linda Riojas MD.  We are glad to serve you and happy to provide you with this summary of your v Potassium Chloride ER 10 MEQ Tbcr   Take 1 tablet (10 mEq total) by mouth daily. Commonly known as:  K-DUR           * Notice: This list has 2 medication(s) that are the same as other medications prescribed for you.  Read the directions carefully, and a

## (undated) NOTE — LETTER
9/3/2019    Mali Boateng [address] [facility] [phone]            Dear Mali Barajas,      [address] [facility] records indicate that you are due for an appointment for a Colonoscopy in October 2019, or shortly there after, with Sanchez Guadarrama MD.

## (undated) NOTE — LETTER
6/2/2025              Graciela Rojas        [address] [address]         Dear Graciela,      The report of the Biopsies done on 5/28/25 both show a Seborrheic Keratosis.  These are benign (not cancerous) growths, and require no further treatment.  If you have any questions please contact our office.                Yours Truly,    Jeni Nguyen MD  [facility]  [address] [address] [facility] [address] [phone]

## (undated) NOTE — Clinical Note
Jason Ramires today  Will restart hydrochlorothiazide BP elevated today Will resrtart Vyvanse at a lower dose Will start Lexapro for stress (may be the reason for her elevated BP)  Jaleesa Penn